# Patient Record
Sex: MALE | Race: WHITE | URBAN - METROPOLITAN AREA
[De-identification: names, ages, dates, MRNs, and addresses within clinical notes are randomized per-mention and may not be internally consistent; named-entity substitution may affect disease eponyms.]

---

## 2023-04-21 DIAGNOSIS — F41.9 ANXIETY: ICD-10-CM

## 2023-04-21 PROBLEM — K40.90 INGUINAL HERNIA: Status: ACTIVE | Noted: 2023-04-21

## 2023-04-21 PROBLEM — U07.1 COVID-19 VIRUS INFECTION: Status: ACTIVE | Noted: 2023-04-21

## 2023-04-21 PROBLEM — E11.9 DIET-CONTROLLED DIABETES MELLITUS (MULTI): Status: ACTIVE | Noted: 2023-04-21

## 2023-04-21 PROBLEM — I10 BENIGN ESSENTIAL HTN: Status: ACTIVE | Noted: 2023-04-21

## 2023-04-21 PROBLEM — J30.9 ALLERGIC RHINITIS: Status: ACTIVE | Noted: 2023-04-21

## 2023-04-21 PROBLEM — I10 BP (HIGH BLOOD PRESSURE): Status: ACTIVE | Noted: 2023-04-21

## 2023-04-21 PROBLEM — Z97.4 HEARING AID WORN: Status: ACTIVE | Noted: 2023-04-21

## 2023-04-21 PROBLEM — R11.0 NAUSEA IN ADULT: Status: ACTIVE | Noted: 2023-04-21

## 2023-04-21 PROBLEM — H91.93 HEARING LOSS, BILATERAL: Status: ACTIVE | Noted: 2023-04-21

## 2023-04-21 PROBLEM — J45.909 ASTHMA (HHS-HCC): Status: ACTIVE | Noted: 2023-04-21

## 2023-04-21 PROBLEM — M25.511 RIGHT SHOULDER PAIN: Status: ACTIVE | Noted: 2023-04-21

## 2023-04-21 RX ORDER — METHOCARBAMOL 750 MG/1
TABLET, FILM COATED ORAL
COMMUNITY
Start: 2022-09-06 | End: 2023-04-27 | Stop reason: ALTCHOICE

## 2023-04-21 RX ORDER — MOMETASONE FUROATE AND FORMOTEROL FUMARATE DIHYDRATE 200; 5 UG/1; UG/1
2 AEROSOL RESPIRATORY (INHALATION) 2 TIMES DAILY
COMMUNITY
End: 2023-08-08 | Stop reason: ALTCHOICE

## 2023-04-21 RX ORDER — METHYLPREDNISOLONE 4 MG/1
TABLET ORAL
COMMUNITY
Start: 2022-09-06 | End: 2023-04-27 | Stop reason: ALTCHOICE

## 2023-04-21 RX ORDER — CYCLOBENZAPRINE HCL 10 MG
TABLET ORAL
COMMUNITY

## 2023-04-21 RX ORDER — ASPIRIN 81 MG/1
1 TABLET ORAL DAILY
COMMUNITY
Start: 2022-09-01

## 2023-04-21 RX ORDER — ALPRAZOLAM 0.5 MG/1
0.5 TABLET ORAL NIGHTLY PRN
COMMUNITY
End: 2023-04-21 | Stop reason: SDUPTHER

## 2023-04-21 RX ORDER — BLOOD-GLUCOSE METER
KIT MISCELLANEOUS
COMMUNITY
Start: 2021-12-22 | End: 2024-03-27 | Stop reason: SDUPTHER

## 2023-04-21 RX ORDER — TRAMADOL HYDROCHLORIDE 50 MG/1
1 TABLET ORAL
COMMUNITY
Start: 2023-02-15 | End: 2024-03-05 | Stop reason: ALTCHOICE

## 2023-04-21 RX ORDER — ALBUTEROL SULFATE 90 UG/1
AEROSOL, METERED RESPIRATORY (INHALATION)
COMMUNITY

## 2023-04-21 RX ORDER — LANSOPRAZOLE 30 MG/1
CAPSULE, DELAYED RELEASE ORAL
COMMUNITY
End: 2023-08-08 | Stop reason: SDUPTHER

## 2023-04-21 RX ORDER — LISINOPRIL 20 MG/1
TABLET ORAL
COMMUNITY
End: 2023-08-08 | Stop reason: DRUGHIGH

## 2023-04-21 RX ORDER — BUSPIRONE HYDROCHLORIDE 5 MG/1
TABLET ORAL
COMMUNITY
End: 2023-08-08 | Stop reason: SDUPTHER

## 2023-04-24 RX ORDER — ALPRAZOLAM 0.5 MG/1
0.5 TABLET ORAL NIGHTLY PRN
Qty: 30 TABLET | Refills: 0 | Status: SHIPPED | OUTPATIENT
Start: 2023-04-24 | End: 2023-04-27 | Stop reason: SDUPTHER

## 2023-04-24 NOTE — TELEPHONE ENCOUNTER
I signed his rx, however this is benzo, controlled med, and he missed 3 months Follow up. And I do not see any future appointment. Please schedule appointment

## 2023-04-27 ENCOUNTER — OFFICE VISIT (OUTPATIENT)
Dept: PRIMARY CARE | Facility: CLINIC | Age: 68
End: 2023-04-27
Payer: MEDICARE

## 2023-04-27 VITALS
TEMPERATURE: 97.9 F | OXYGEN SATURATION: 95 % | SYSTOLIC BLOOD PRESSURE: 124 MMHG | HEART RATE: 99 BPM | DIASTOLIC BLOOD PRESSURE: 77 MMHG | WEIGHT: 176 LBS | HEIGHT: 68 IN | BODY MASS INDEX: 26.67 KG/M2

## 2023-04-27 DIAGNOSIS — F41.9 ANXIETY: ICD-10-CM

## 2023-04-27 DIAGNOSIS — G89.29 CHRONIC RIGHT SHOULDER PAIN: Primary | ICD-10-CM

## 2023-04-27 DIAGNOSIS — I10 PRIMARY HYPERTENSION: Chronic | ICD-10-CM

## 2023-04-27 DIAGNOSIS — M25.511 CHRONIC RIGHT SHOULDER PAIN: Primary | ICD-10-CM

## 2023-04-27 PROBLEM — M25.569 KNEE PAIN: Status: ACTIVE | Noted: 2023-04-27

## 2023-04-27 PROBLEM — Z91.199 NO-SHOW FOR APPOINTMENT: Status: ACTIVE | Noted: 2020-02-03

## 2023-04-27 PROBLEM — E11.9 TYPE 2 DIABETES MELLITUS (MULTI): Status: ACTIVE | Noted: 2023-04-27

## 2023-04-27 PROBLEM — M17.12 OSTEOARTHRITIS OF LEFT KNEE: Status: ACTIVE | Noted: 2023-04-27

## 2023-04-27 PROBLEM — S43.421A SPRAIN OF RIGHT ROTATOR CUFF CAPSULE: Status: ACTIVE | Noted: 2022-10-06

## 2023-04-27 PROBLEM — S46.011A STRAIN OF TENDON OF RIGHT ROTATOR CUFF: Status: ACTIVE | Noted: 2022-10-06

## 2023-04-27 PROBLEM — M19.90 ARTHRITIS: Status: ACTIVE | Noted: 2023-04-27

## 2023-04-27 PROCEDURE — 3078F DIAST BP <80 MM HG: CPT | Performed by: INTERNAL MEDICINE

## 2023-04-27 PROCEDURE — 3074F SYST BP LT 130 MM HG: CPT | Performed by: INTERNAL MEDICINE

## 2023-04-27 PROCEDURE — 4010F ACE/ARB THERAPY RXD/TAKEN: CPT | Performed by: INTERNAL MEDICINE

## 2023-04-27 PROCEDURE — 1036F TOBACCO NON-USER: CPT | Performed by: INTERNAL MEDICINE

## 2023-04-27 PROCEDURE — 99214 OFFICE O/P EST MOD 30 MIN: CPT | Performed by: INTERNAL MEDICINE

## 2023-04-27 PROCEDURE — 1159F MED LIST DOCD IN RCRD: CPT | Performed by: INTERNAL MEDICINE

## 2023-04-27 RX ORDER — IBUPROFEN 200 MG
400 TABLET ORAL
COMMUNITY
Start: 2019-07-16

## 2023-04-27 RX ORDER — FLUTICASONE PROPIONATE 50 MCG
SPRAY, SUSPENSION (ML) NASAL
COMMUNITY
Start: 2016-05-24 | End: 2023-08-08 | Stop reason: ALTCHOICE

## 2023-04-27 RX ORDER — ALPRAZOLAM 0.5 MG/1
0.5 TABLET ORAL NIGHTLY PRN
Qty: 90 TABLET | Refills: 0 | Status: SHIPPED | OUTPATIENT
Start: 2023-04-27 | End: 2023-08-08 | Stop reason: SDUPTHER

## 2023-04-27 NOTE — PROGRESS NOTES
The patient is here for:   Chief Complaint   Patient presents with    3 MONTH FUV        I have reviewed existing histories, notes, past medical history, surgical history, social history, family history, med list, allergy list, and immunization, and updated if applicable.  PCP: Neema Antony MD    HPI:     Follow up for anxiety and chronic pain. Will get right shoulder fixed in next few months.  Xanax taking for anxiety, taking half tab bid.  Pt and wife both have retired.  No Side effects noted to that and tramadol  OARRS:  No data recorded  I have personally reviewed the OARRS report for Rodrigo Dill. I have considered the risks of abuse, dependence, addiction and diversion    Is the patient prescribed a combination of a benzodiazepine and opioid?  Yes, I feel it is clincially indicated to continue the medication and have discussed with the patient risks/benefits/alternatives.    Last Urine Drug Screen / ordered today: Yes  Recent Results (from the past 99779 hour(s))   OPIATE/OPIOID/BENZO PRESCRIPTION COMPLIANCE    Collection Time: 01/16/23 11:37 AM   Result Value Ref Range    DRUG SCREEN COMMENT URINE SEE BELOW     Creatine, Urine 46.5 mg/dL    Amphetamine Screen, Urine PRESUMPTIVE NEGATIVE NEGATIVE    Barbiturate Screen, Urine PRESUMPTIVE NEGATIVE NEGATIVE    Cannabinoid Screen, Urine PRESUMPTIVE NEGATIVE NEGATIVE    Cocaine Screen, Urine PRESUMPTIVE NEGATIVE NEGATIVE    PCP Screen, Urine PRESUMPTIVE NEGATIVE NEGATIVE    7-Aminoclonazepam <25 Cutoff <25 ng/mL    Alpha-Hydroxyalprazolam <25 Cutoff <25 ng/mL    Alpha-Hydroxymidazolam <25 Cutoff <25 ng/mL    Alprazolam <25 Cutoff <25 ng/mL    Chlordiazepoxide <25 Cutoff <25 ng/mL    Clonazepam <25 Cutoff <25 ng/mL    Diazepam <25 Cutoff <25 ng/mL    Lorazepam <25 Cutoff <25 ng/mL    Midazolam <25 Cutoff <25 ng/mL    Nordiazepam <25 Cutoff <25 ng/mL    Oxazepam <25 Cutoff <25 ng/mL    Temazepam <25 Cutoff <25 ng/mL    Zolpidem <25 Cutoff <25 ng/mL    Zolpidem  Metabolite (ZCA) <25 Cutoff <25 ng/mL    6-Acetylmorphine <25 Cutoff <25 ng/mL    Codeine <50 Cutoff <50 ng/mL    Hydrocodone <25 Cutoff <25 ng/mL    Hydromorphone <25 Cutoff <25 ng/mL    Morphine Urine <50 Cutoff <50 ng/mL    Norhydrocodone <25 Cutoff <25 ng/mL    Noroxycodone <25 Cutoff <25 ng/mL    Oxycodone <25 Cutoff <25 ng/mL    Oxymorphone <25 Cutoff <25 ng/mL    Tramadol >1000 (A) Cutoff <50 ng/mL    O-Desmethyltramadol >1000 (A) Cutoff <50 ng/mL    Fentanyl <2.5 Cutoff<2.5 ng/mL    Norfentanyl <2.5 Cutoff<2.5 ng/mL    METHADONE CONFIRMATION,URINE <25 Cutoff <25 ng/mL    EDDP <25 Cutoff <25 ng/mL     Results are as expected.     Controlled Substance Agreement:  Date of the Last Agreement: 1/1/2023  Reviewed Controlled Substance Agreement including but not limited to the benefits, risks, and alternatives to treatment with a Controlled Substance medication(s).    Opioids:  What is the patient's goal of therapy? Pain control, to have right shoulder surgery soon  Is this being achieved with current treatment?  yes    I have calculated the patient's Morphine Dose Equivalent (MED):   I have considered referral to Pain Management and/or a specialist, and do not feel it is necessary at this time.    I feel that it is clinically indicated to continue this current medication regimen after consideration of alternative therapies, and other non-opioid treatment.    Opioid Risk Screening:  No data recorded    Pain Assessment:  No data recorded and Benzodiazepines:  What is the patient's goal of therapy? anxiety and insomnia  Is this being achieved with current treatment?  yes    VIVI-7:  No data recorded    Activities of Daily Living:   Is your overall impression that this patient is benefiting (symptom reduction outweighs side effects) from benzodiazepine therapy? Yes     1. Physical Functioning: Better  2. Family Relationship: Better  3. Social Relationship: Better  4. Mood: Better  5. Sleep Patterns: Better  6. Overall  "Function: Better  Lab Results   Component Value Date    WBC 6.1 10/19/2022    HGB 13.9 10/19/2022    HCT 41.9 10/19/2022     10/19/2022    CHOL 171 12/22/2021    TRIG 89 01/23/2021    HDL 38.4 (A) 12/22/2021    LDLDIRECT 107 12/22/2021    ALT 23 12/22/2021    AST 19 12/22/2021     (L) 10/25/2022    K 4.4 10/25/2022     10/25/2022    CREATININE 1.17 10/25/2022    BUN 20 10/25/2022    CO2 26 10/25/2022    PSA 0.43 01/23/2021    HGBA1C 7.5 (A) 10/19/2022     Physical exam:  /77   Pulse 99   Temp 36.6 °C (97.9 °F)   Ht 1.727 m (5' 8\")   Wt 79.8 kg (176 lb)   SpO2 95%   BMI 26.76 kg/m²    NAD, mood affection appropriate.  Came in with wife today  No carotid bruit  Neck exam showed no mass, lymphadenopathy, or thyroid enlargement, and no carotid bruit.  Heart exam showed normal heart sounds, no murmur, clicks, gallops or rubs. Regular rate and rhythm. Chest is clear; no wheezes or rales.  No leg edema    Assessments/orders:   1. Anxiety     - Alprazolam (Xanax) 0.5 mg tablet; Take 1 tablet (0.5 mg) by mouth as needed at bedtime for anxiety.  Dispense: 90 tablet; Refill: 0    2. Chronic right shoulder pain       3. Primary hypertension         Plan/discussion and follow up:   Follow up 90 days  Refilled xanax for 90 days today  It was sent to wrong pharmacy earlier this week           "

## 2023-05-02 ENCOUNTER — TELEPHONE (OUTPATIENT)
Dept: PRIMARY CARE | Facility: CLINIC | Age: 68
End: 2023-05-02
Payer: COMMERCIAL

## 2023-05-09 LAB
ANION GAP IN SER/PLAS: 12 MMOL/L (ref 10–20)
BASOPHILS (10*3/UL) IN BLOOD BY AUTOMATED COUNT: 0.04 X10E9/L (ref 0–0.1)
BASOPHILS/100 LEUKOCYTES IN BLOOD BY AUTOMATED COUNT: 0.5 % (ref 0–2)
CALCIUM (MG/DL) IN SER/PLAS: 9.9 MG/DL (ref 8.6–10.3)
CARBON DIOXIDE, TOTAL (MMOL/L) IN SER/PLAS: 27 MMOL/L (ref 21–32)
CHLORIDE (MMOL/L) IN SER/PLAS: 100 MMOL/L (ref 98–107)
CREATININE (MG/DL) IN SER/PLAS: 1.01 MG/DL (ref 0.5–1.3)
EOSINOPHILS (10*3/UL) IN BLOOD BY AUTOMATED COUNT: 0.23 X10E9/L (ref 0–0.7)
EOSINOPHILS/100 LEUKOCYTES IN BLOOD BY AUTOMATED COUNT: 3.1 % (ref 0–6)
ERYTHROCYTE DISTRIBUTION WIDTH (RATIO) BY AUTOMATED COUNT: 13.1 % (ref 11.5–14.5)
ERYTHROCYTE MEAN CORPUSCULAR HEMOGLOBIN CONCENTRATION (G/DL) BY AUTOMATED: 32.5 G/DL (ref 32–36)
ERYTHROCYTE MEAN CORPUSCULAR VOLUME (FL) BY AUTOMATED COUNT: 93 FL (ref 80–100)
ERYTHROCYTES (10*6/UL) IN BLOOD BY AUTOMATED COUNT: 4.96 X10E12/L (ref 4.5–5.9)
GFR MALE: 82 ML/MIN/1.73M2
GLUCOSE (MG/DL) IN SER/PLAS: 129 MG/DL (ref 74–99)
HEMATOCRIT (%) IN BLOOD BY AUTOMATED COUNT: 45.9 % (ref 41–52)
HEMOGLOBIN (G/DL) IN BLOOD: 14.9 G/DL (ref 13.5–17.5)
IMMATURE GRANULOCYTES/100 LEUKOCYTES IN BLOOD BY AUTOMATED COUNT: 0.3 % (ref 0–0.9)
LEUKOCYTES (10*3/UL) IN BLOOD BY AUTOMATED COUNT: 7.3 X10E9/L (ref 4.4–11.3)
LYMPHOCYTES (10*3/UL) IN BLOOD BY AUTOMATED COUNT: 2.89 X10E9/L (ref 1.2–4.8)
LYMPHOCYTES/100 LEUKOCYTES IN BLOOD BY AUTOMATED COUNT: 39.4 % (ref 13–44)
MONOCYTES (10*3/UL) IN BLOOD BY AUTOMATED COUNT: 0.72 X10E9/L (ref 0.1–1)
MONOCYTES/100 LEUKOCYTES IN BLOOD BY AUTOMATED COUNT: 9.8 % (ref 2–10)
NEUTROPHILS (10*3/UL) IN BLOOD BY AUTOMATED COUNT: 3.44 X10E9/L (ref 1.2–7.7)
NEUTROPHILS/100 LEUKOCYTES IN BLOOD BY AUTOMATED COUNT: 46.9 % (ref 40–80)
PLATELETS (10*3/UL) IN BLOOD AUTOMATED COUNT: 275 X10E9/L (ref 150–450)
POTASSIUM (MMOL/L) IN SER/PLAS: 4.4 MMOL/L (ref 3.5–5.3)
SODIUM (MMOL/L) IN SER/PLAS: 135 MMOL/L (ref 136–145)
UREA NITROGEN (MG/DL) IN SER/PLAS: 20 MG/DL (ref 6–23)

## 2023-05-11 LAB — STAPH/MRSA SCREEN, CULTURE: NORMAL

## 2023-05-24 ENCOUNTER — HOSPITAL ENCOUNTER (OUTPATIENT)
Dept: DATA CONVERSION | Facility: HOSPITAL | Age: 68
End: 2023-05-24
Attending: ORTHOPAEDIC SURGERY | Admitting: ORTHOPAEDIC SURGERY
Payer: COMMERCIAL

## 2023-05-24 DIAGNOSIS — Z87.891 PERSONAL HISTORY OF NICOTINE DEPENDENCE: ICD-10-CM

## 2023-05-24 DIAGNOSIS — I10 ESSENTIAL (PRIMARY) HYPERTENSION: ICD-10-CM

## 2023-05-24 DIAGNOSIS — M25.511 PAIN IN RIGHT SHOULDER: ICD-10-CM

## 2023-05-24 DIAGNOSIS — S46.011A STRAIN OF MUSCLE(S) AND TENDON(S) OF THE ROTATOR CUFF OF RIGHT SHOULDER, INITIAL ENCOUNTER: ICD-10-CM

## 2023-05-24 DIAGNOSIS — M75.21 BICIPITAL TENDINITIS, RIGHT SHOULDER: ICD-10-CM

## 2023-05-24 DIAGNOSIS — J45.909 UNSPECIFIED ASTHMA, UNCOMPLICATED (HHS-HCC): ICD-10-CM

## 2023-05-24 DIAGNOSIS — Z79.82 LONG TERM (CURRENT) USE OF ASPIRIN: ICD-10-CM

## 2023-05-25 LAB
BASOPHILS (10*3/UL) IN BLOOD BY AUTOMATED COUNT: NORMAL
BASOPHILS/100 LEUKOCYTES IN BLOOD BY AUTOMATED COUNT: NORMAL
EOSINOPHILS (10*3/UL) IN BLOOD BY AUTOMATED COUNT: NORMAL
EOSINOPHILS/100 LEUKOCYTES IN BLOOD BY AUTOMATED COUNT: NORMAL
ERYTHROCYTE DISTRIBUTION WIDTH (RATIO) BY AUTOMATED COUNT: NORMAL
ERYTHROCYTE MEAN CORPUSCULAR HEMOGLOBIN CONCENTRATION (G/DL) BY AUTOMATED: NORMAL
ERYTHROCYTE MEAN CORPUSCULAR VOLUME (FL) BY AUTOMATED COUNT: NORMAL
ERYTHROCYTES (10*6/UL) IN BLOOD BY AUTOMATED COUNT: NORMAL
HEMATOCRIT (%) IN BLOOD BY AUTOMATED COUNT: NORMAL
HEMOGLOBIN (G/DL) IN BLOOD: NORMAL
IMMATURE GRANULOCYTES/100 LEUKOCYTES IN BLOOD BY AUTOMATED COUNT: NORMAL
LEUKOCYTES (10*3/UL) IN BLOOD BY AUTOMATED COUNT: NORMAL
LYMPHOCYTES (10*3/UL) IN BLOOD BY AUTOMATED COUNT: NORMAL
LYMPHOCYTES/100 LEUKOCYTES IN BLOOD BY AUTOMATED COUNT: NORMAL
MANUAL DIFFERENTIAL Y/N: NORMAL
MONOCYTES (10*3/UL) IN BLOOD BY AUTOMATED COUNT: NORMAL
MONOCYTES/100 LEUKOCYTES IN BLOOD BY AUTOMATED COUNT: NORMAL
NEUTROPHILS (10*3/UL) IN BLOOD BY AUTOMATED COUNT: NORMAL
NEUTROPHILS/100 LEUKOCYTES IN BLOOD BY AUTOMATED COUNT: NORMAL
NRBC (PER 100 WBCS) BY AUTOMATED COUNT: NORMAL
PLATELETS (10*3/UL) IN BLOOD AUTOMATED COUNT: NORMAL

## 2023-07-27 ENCOUNTER — APPOINTMENT (OUTPATIENT)
Dept: PRIMARY CARE | Facility: CLINIC | Age: 68
End: 2023-07-27
Payer: MEDICARE

## 2023-08-07 PROBLEM — S46.919A: Status: ACTIVE | Noted: 2023-08-07

## 2023-08-07 PROBLEM — Z96.611 STATUS POST TOTAL REPLACEMENT OF RIGHT SHOULDER: Status: ACTIVE | Noted: 2023-08-07

## 2023-08-07 PROBLEM — M75.101 ROTATOR CUFF SYNDROME OF RIGHT SHOULDER: Status: ACTIVE | Noted: 2023-08-07

## 2023-08-08 ENCOUNTER — OFFICE VISIT (OUTPATIENT)
Dept: PRIMARY CARE | Facility: CLINIC | Age: 68
End: 2023-08-08
Payer: MEDICARE

## 2023-08-08 VITALS
DIASTOLIC BLOOD PRESSURE: 66 MMHG | OXYGEN SATURATION: 95 % | HEIGHT: 68 IN | SYSTOLIC BLOOD PRESSURE: 110 MMHG | BODY MASS INDEX: 26.37 KG/M2 | WEIGHT: 174 LBS | HEART RATE: 89 BPM | TEMPERATURE: 97.7 F

## 2023-08-08 DIAGNOSIS — F41.9 ANXIETY: ICD-10-CM

## 2023-08-08 DIAGNOSIS — I10 BENIGN ESSENTIAL HTN: ICD-10-CM

## 2023-08-08 DIAGNOSIS — E78.5 HYPERLIPIDEMIA, UNSPECIFIED HYPERLIPIDEMIA TYPE: ICD-10-CM

## 2023-08-08 DIAGNOSIS — K21.9 GASTROESOPHAGEAL REFLUX DISEASE WITHOUT ESOPHAGITIS: ICD-10-CM

## 2023-08-08 DIAGNOSIS — Z12.5 SCREENING FOR PROSTATE CANCER: ICD-10-CM

## 2023-08-08 DIAGNOSIS — E11.9 DIET-CONTROLLED DIABETES MELLITUS (MULTI): Primary | ICD-10-CM

## 2023-08-08 PROCEDURE — 1159F MED LIST DOCD IN RCRD: CPT | Performed by: INTERNAL MEDICINE

## 2023-08-08 PROCEDURE — 1125F AMNT PAIN NOTED PAIN PRSNT: CPT | Performed by: INTERNAL MEDICINE

## 2023-08-08 PROCEDURE — 3078F DIAST BP <80 MM HG: CPT | Performed by: INTERNAL MEDICINE

## 2023-08-08 PROCEDURE — 4010F ACE/ARB THERAPY RXD/TAKEN: CPT | Performed by: INTERNAL MEDICINE

## 2023-08-08 PROCEDURE — 3074F SYST BP LT 130 MM HG: CPT | Performed by: INTERNAL MEDICINE

## 2023-08-08 PROCEDURE — 99214 OFFICE O/P EST MOD 30 MIN: CPT | Performed by: INTERNAL MEDICINE

## 2023-08-08 PROCEDURE — 1036F TOBACCO NON-USER: CPT | Performed by: INTERNAL MEDICINE

## 2023-08-08 RX ORDER — LANSOPRAZOLE 30 MG/1
30 CAPSULE, DELAYED RELEASE ORAL
Qty: 30 CAPSULE | Refills: 5 | Status: SHIPPED | OUTPATIENT
Start: 2023-08-08 | End: 2024-03-05 | Stop reason: SDUPTHER

## 2023-08-08 RX ORDER — BUSPIRONE HYDROCHLORIDE 5 MG/1
5 TABLET ORAL 2 TIMES DAILY
Qty: 60 TABLET | Refills: 5 | Status: SHIPPED | OUTPATIENT
Start: 2023-08-08 | End: 2024-03-05 | Stop reason: SDUPTHER

## 2023-08-08 RX ORDER — LISINOPRIL 5 MG/1
5 TABLET ORAL DAILY
Qty: 30 TABLET | Refills: 5 | Status: SHIPPED | OUTPATIENT
Start: 2023-08-08 | End: 2023-08-09

## 2023-08-08 RX ORDER — ALPRAZOLAM 0.5 MG/1
0.5 TABLET ORAL NIGHTLY PRN
Qty: 30 TABLET | Refills: 2 | Status: SHIPPED | OUTPATIENT
Start: 2023-08-08 | End: 2023-09-06 | Stop reason: SDUPTHER

## 2023-08-08 NOTE — PROGRESS NOTES
SUBJECTIVE:   Rodrigo Dill is a 67 y.o. male presenting for Follow up   PCP: Neema Antony MD   Had right shoulder surgical repair 3 months ago.  Doing PT, much better  Only take tramadol before going to PT,  Needs refills for xanax.   OARRS:  Neema Antony MD on 8/8/2023 12:36 PM  I have personally reviewed the OARRS report for Rodrigo Dill. I have considered the risks of abuse, dependence, addiction and diversion    Is the patient prescribed a combination of a benzodiazepine and opioid?  Yes, I feel it is clincially indicated to continue the medication and have discussed with the patient risks/benefits/alternatives.    Last Urine Drug Screen / ordered today: Yes  Recent Results (from the past 35885 hour(s))   OPIATE/OPIOID/BENZO PRESCRIPTION COMPLIANCE    Collection Time: 01/16/23 11:37 AM   Result Value Ref Range    DRUG SCREEN COMMENT URINE SEE BELOW     Creatine, Urine 46.5 mg/dL    Amphetamine Screen, Urine PRESUMPTIVE NEGATIVE NEGATIVE    Barbiturate Screen, Urine PRESUMPTIVE NEGATIVE NEGATIVE    Cannabinoid Screen, Urine PRESUMPTIVE NEGATIVE NEGATIVE    Cocaine Screen, Urine PRESUMPTIVE NEGATIVE NEGATIVE    PCP Screen, Urine PRESUMPTIVE NEGATIVE NEGATIVE    7-Aminoclonazepam <25 Cutoff <25 ng/mL    Alpha-Hydroxyalprazolam <25 Cutoff <25 ng/mL    Alpha-Hydroxymidazolam <25 Cutoff <25 ng/mL    Alprazolam <25 Cutoff <25 ng/mL    Chlordiazepoxide <25 Cutoff <25 ng/mL    Clonazepam <25 Cutoff <25 ng/mL    Diazepam <25 Cutoff <25 ng/mL    Lorazepam <25 Cutoff <25 ng/mL    Midazolam <25 Cutoff <25 ng/mL    Nordiazepam <25 Cutoff <25 ng/mL    Oxazepam <25 Cutoff <25 ng/mL    Temazepam <25 Cutoff <25 ng/mL    Zolpidem <25 Cutoff <25 ng/mL    Zolpidem Metabolite (ZCA) <25 Cutoff <25 ng/mL    6-Acetylmorphine <25 Cutoff <25 ng/mL    Codeine <50 Cutoff <50 ng/mL    Hydrocodone <25 Cutoff <25 ng/mL    Hydromorphone <25 Cutoff <25 ng/mL    Morphine Urine <50 Cutoff <50 ng/mL    Norhydrocodone <25 Cutoff <25 ng/mL     "Noroxycodone <25 Cutoff <25 ng/mL    Oxycodone <25 Cutoff <25 ng/mL    Oxymorphone <25 Cutoff <25 ng/mL    Tramadol >1000 (A) Cutoff <50 ng/mL    O-Desmethyltramadol >1000 (A) Cutoff <50 ng/mL    Fentanyl <2.5 Cutoff<2.5 ng/mL    Norfentanyl <2.5 Cutoff<2.5 ng/mL    METHADONE CONFIRMATION,URINE <25 Cutoff <25 ng/mL    EDDP <25 Cutoff <25 ng/mL     Results are as expected.     Controlled Substance Agreement:  Date of the Last Agreement: January 2023  Reviewed Controlled Substance Agreement including but not limited to the benefits, risks, and alternatives to treatment with a Controlled Substance medication(s).    Benzodiazepines:  What is the patient's goal of therapy? Anxiety control  Is this being achieved with current treatment? yes    VIVI-7:  No data recorded    Activities of Daily Living:   Is your overall impression that this patient is benefiting (symptom reduction outweighs side effects) from benzodiazepine therapy? Yes     1. Physical Functioning: Better  2. Family Relationship: Better  3. Social Relationship: Better  4. Mood: Better  5. Sleep Patterns: Better  6. Overall Function: Better  Colon screening:   his insurance company just send him for FIT test, pending result.    Lab Results   Component Value Date    PSA 0.43 01/23/2021     Vaccines Up to date:    Diet:   healthy.  Exercises:   regularly.  Lab Results   Component Value Date    HGBA1C 7.5 (A) 10/19/2022     Lab Results   Component Value Date    CREATININE 1.01 05/09/2023     Lab Results   Component Value Date    CHOL 171 12/22/2021    CHOL 168 01/23/2021     Lab Results   Component Value Date    HDL 38.4 (A) 12/22/2021    HDL 44.3 01/23/2021     No results found for: \"LDLCALC\"  Lab Results   Component Value Date    TRIG 89 01/23/2021     No components found for: \"CHOLHDL\"    ROS:  Feeling well. No dyspnea or chest pain on exertion. No abdominal pain, change in bowel habits, black or bloody stools. No urinary tract or prostatic symptoms. No " "neurological complaints.       OBJECTIVE:   The patient appears well, alert, oriented x 3, in no distress.   /66   Pulse 89   Temp 36.5 °C (97.7 °F)   Ht 1.715 m (5' 7.5\")   Wt 78.9 kg (174 lb)   SpO2 95%   BMI 26.85 kg/m²   ENT normal.  Neck supple. No adenopathy or thyromegaly. DORIS. Lungs are clear, good air entry, no wheezes, rhonchi or rales. S1 and S2 normal, no murmurs, regular rate and rhythm. Abdomen is soft without tenderness, guarding, mass or organomegaly. Extremities show no edema, normal peripheral pulses. Neurological is normal without focal findings.      1. Diet-controlled diabetes mellitus (CMS/HCC)  Hemoglobin A1C, Comprehensive Metabolic Panel      2. Benign essential HTN  lisinopril 5 mg tablet, Comprehensive Metabolic Panel      3. Anxiety  busPIRone (Buspar) 5 mg tablet, ALPRAZolam (Xanax) 0.5 mg tablet      4. Gastroesophageal reflux disease without esophagitis  lansoprazole (Prevacid) 30 mg DR capsule      5. Hyperlipidemia, unspecified hyperlipidemia type  Lipid Panel      6. Screening for prostate cancer  Prostate Specific Antigen, Screen      Follow up 3 months       "

## 2023-08-09 ENCOUNTER — LAB (OUTPATIENT)
Dept: LAB | Facility: LAB | Age: 68
End: 2023-08-09
Payer: MEDICARE

## 2023-08-09 DIAGNOSIS — Z12.5 SCREENING FOR PROSTATE CANCER: ICD-10-CM

## 2023-08-09 DIAGNOSIS — E78.5 HYPERLIPIDEMIA, UNSPECIFIED HYPERLIPIDEMIA TYPE: ICD-10-CM

## 2023-08-09 DIAGNOSIS — E11.9 DIET-CONTROLLED DIABETES MELLITUS (MULTI): ICD-10-CM

## 2023-08-09 DIAGNOSIS — I10 BENIGN ESSENTIAL HTN: ICD-10-CM

## 2023-08-09 LAB
ALANINE AMINOTRANSFERASE (SGPT) (U/L) IN SER/PLAS: 38 U/L (ref 10–52)
ALBUMIN (G/DL) IN SER/PLAS: 4.4 G/DL (ref 3.4–5)
ALKALINE PHOSPHATASE (U/L) IN SER/PLAS: 48 U/L (ref 33–136)
ANION GAP IN SER/PLAS: 11 MMOL/L (ref 10–20)
ASPARTATE AMINOTRANSFERASE (SGOT) (U/L) IN SER/PLAS: 35 U/L (ref 9–39)
BILIRUBIN TOTAL (MG/DL) IN SER/PLAS: 0.6 MG/DL (ref 0–1.2)
CALCIUM (MG/DL) IN SER/PLAS: 10 MG/DL (ref 8.6–10.3)
CARBON DIOXIDE, TOTAL (MMOL/L) IN SER/PLAS: 29 MMOL/L (ref 21–32)
CHLORIDE (MMOL/L) IN SER/PLAS: 102 MMOL/L (ref 98–107)
CHOLESTEROL (MG/DL) IN SER/PLAS: 192 MG/DL (ref 0–199)
CHOLESTEROL IN HDL (MG/DL) IN SER/PLAS: 45 MG/DL
CHOLESTEROL/HDL RATIO: 4.3
CREATININE (MG/DL) IN SER/PLAS: 1.08 MG/DL (ref 0.5–1.3)
ESTIMATED AVERAGE GLUCOSE FOR HBA1C: 194 MG/DL
GFR MALE: 75 ML/MIN/1.73M2
GLUCOSE (MG/DL) IN SER/PLAS: 153 MG/DL (ref 74–99)
HEMOGLOBIN A1C/HEMOGLOBIN TOTAL IN BLOOD: 8.4 %
LDL: 117 MG/DL (ref 0–99)
POTASSIUM (MMOL/L) IN SER/PLAS: 5.3 MMOL/L (ref 3.5–5.3)
PROSTATE SPECIFIC ANTIGEN,SCREEN: 0.62 NG/ML (ref 0–4)
PROTEIN TOTAL: 7.4 G/DL (ref 6.4–8.2)
SODIUM (MMOL/L) IN SER/PLAS: 137 MMOL/L (ref 136–145)
TRIGLYCERIDE (MG/DL) IN SER/PLAS: 152 MG/DL (ref 0–149)
UREA NITROGEN (MG/DL) IN SER/PLAS: 24 MG/DL (ref 6–23)
VLDL: 30 MG/DL (ref 0–40)

## 2023-08-09 PROCEDURE — 36415 COLL VENOUS BLD VENIPUNCTURE: CPT

## 2023-08-09 PROCEDURE — 80061 LIPID PANEL: CPT

## 2023-08-09 PROCEDURE — 83036 HEMOGLOBIN GLYCOSYLATED A1C: CPT

## 2023-08-09 PROCEDURE — 80053 COMPREHEN METABOLIC PANEL: CPT

## 2023-08-09 PROCEDURE — G0103 PSA SCREENING: HCPCS

## 2023-08-09 RX ORDER — LISINOPRIL 5 MG/1
5 TABLET ORAL DAILY
Qty: 90 TABLET | Refills: 1 | Status: SHIPPED | OUTPATIENT
Start: 2023-08-09 | End: 2024-02-16 | Stop reason: SDUPTHER

## 2023-09-06 DIAGNOSIS — F41.9 ANXIETY: ICD-10-CM

## 2023-09-06 RX ORDER — ALPRAZOLAM 0.5 MG/1
0.5 TABLET ORAL NIGHTLY PRN
Qty: 30 TABLET | Refills: 0 | Status: CANCELLED | OUTPATIENT
Start: 2023-09-06

## 2023-09-06 RX ORDER — LISINOPRIL 20 MG/1
TABLET ORAL
COMMUNITY
Start: 2023-08-31 | End: 2024-02-16 | Stop reason: SDUPTHER

## 2023-09-06 RX ORDER — ALPRAZOLAM 0.5 MG/1
0.5 TABLET ORAL NIGHTLY PRN
Qty: 30 TABLET | Refills: 0 | Status: SHIPPED | OUTPATIENT
Start: 2023-09-06 | End: 2023-10-09 | Stop reason: SDUPTHER

## 2023-09-07 VITALS — BODY MASS INDEX: 25.89 KG/M2 | HEIGHT: 68 IN | WEIGHT: 170.86 LBS

## 2023-09-30 NOTE — H&P
History & Physical Reviewed:   I have reviewed the History and Physical dated:  09-May-2023   History and Physical reviewed and relevant findings noted. Patient examined to review pertinent physical  findings.: No significant changes   Home Medications Reviewed: no changes noted   Allergies Reviewed: no changes noted       ERAS (Enhanced Recovery After Surgery):  ·  ERAS Patient: no     Consent:   COVID-19 Consent:  ·  COVID-19 Risk Consent Surgeon has reviewed key risks related to the risk of jennifer COVID-19 and if they contract COVID-19 what the risks are.       Electronic Signatures:  Oswaldo Ackerman)  (Signed 24-May-2023 09:48)   Authored: History & Physical Reviewed, ERAS, Consent,  Note Completion      Last Updated: 24-May-2023 09:48 by Oswaldo Ackerman)

## 2023-10-02 ENCOUNTER — APPOINTMENT (OUTPATIENT)
Dept: PHYSICAL THERAPY | Facility: CLINIC | Age: 68
End: 2023-10-02
Payer: COMMERCIAL

## 2023-10-02 NOTE — OP NOTE
Post Operative Note:     PreOp Diagnosis: right shoulder rotator cuff tear,  biceps tendonopathy   Post-Procedure Diagnosis: same   Procedure: 1. reverse total shoulder replacement  2. biceps tenodesis  3.   4.   5.   Surgeon: minerva   Resident/Fellow/Other Assistant: Adonay/Joe   Estimated Blood Loss (mL): 50   Specimen: no   Findings: massive cuff tear       Electronic Signatures:  Oswaldo Ackerman)  (Signed 24-May-2023 12:45)   Authored: Post Operative Note, Note Completion      Last Updated: 24-May-2023 12:45 by Oswaldo Ackerman)

## 2023-10-04 ENCOUNTER — TREATMENT (OUTPATIENT)
Dept: PHYSICAL THERAPY | Facility: CLINIC | Age: 68
End: 2023-10-04
Payer: COMMERCIAL

## 2023-10-04 DIAGNOSIS — S46.911D: ICD-10-CM

## 2023-10-04 DIAGNOSIS — S46.011D TRAUMATIC COMPLETE TEAR OF RIGHT ROTATOR CUFF, SUBSEQUENT ENCOUNTER: Primary | ICD-10-CM

## 2023-10-04 PROBLEM — S46.011A TRAUMATIC COMPLETE TEAR OF RIGHT ROTATOR CUFF: Status: ACTIVE | Noted: 2023-10-04

## 2023-10-04 PROCEDURE — 97140 MANUAL THERAPY 1/> REGIONS: CPT | Mod: GP | Performed by: PHYSICAL THERAPIST

## 2023-10-04 PROCEDURE — 97110 THERAPEUTIC EXERCISES: CPT | Mod: GP | Performed by: PHYSICAL THERAPIST

## 2023-10-04 ASSESSMENT — PAIN SCALES - GENERAL: PAINLEVEL_OUTOF10: 0 - NO PAIN

## 2023-10-04 ASSESSMENT — PAIN - FUNCTIONAL ASSESSMENT: PAIN_FUNCTIONAL_ASSESSMENT: 0-10

## 2023-10-04 ASSESSMENT — ENCOUNTER SYMPTOMS
DEPRESSION: 0
OCCASIONAL FEELINGS OF UNSTEADINESS: 0
LOSS OF SENSATION IN FEET: 0

## 2023-10-04 NOTE — PROGRESS NOTES
Physical Therapy    Physical Therapy Treatment    Patient Name: Rodrigo Dill  MRN: [id number]  Today's Date: 10/4/2023  Time Calculation  Start Time: 1104  Stop Time: 1150  Time Calculation (min): 46 min  Visit number: 22   Approved number of visits: 28 (24 approved)   [facility]  100% UCR  5/31/23-10/11/23     Assessment:  PT Assessment  PT Assessment Results: Decreased strength, Decreased range of motion, Pain  Rehab Prognosis: Excellent  PT treatment focuses on therapeutic exercises targeting improving strength and stability of the right upper extremity. Patient is able to complete all exercises fully this date, but notes increased pain with active elevation of the shoulder against gravity. Increased muscular compensation of the upper trapezius is also noted during active elevation. No significant increase in pain is reported at end of session, but increased muscular fatigue as expected. HEP updated for additional progression while not in PT sessions. He has progressed well towards his established therapy goals, and PT will plan to perform re-evaluation at the end of next week to determine discharge vs. Extension of POC.    Plan:  OP PT Plan  PT Plan: Skilled PT  Rehab Potential: Excellent  Continue to progress strength and stability of the right upper extremity  Current Problem  1. Traumatic complete tear of right rotator cuff, subsequent encounter        2. Rupture of tendon of upper extremity, right, subsequent encounter            Subjective   Pt reports that the shoulder has been feeling good, and he has been relatively pain free since     Precautions     Pain  Pain Assessment: 0-10  Pain Score: 0 - No pain  Pain Location: Shoulder  Pain Orientation: Right    Objective   Pt demonstrates increased activation of the upper trapezius during standing shoulder lifts to compensate for weakness in the upper extremity    Treatments:  Therapeutic Exercise  Therapeutic Exercise Activity 1: UBE L3 2' fwd 2' bwd  "ALT  Therapeutic Exercise Activity 2: Supine shoulder flexion 1# 2x10  Therapeutic Exercise Activity 3: Ball on wall up/down, R/L, CW/CCW x20 ea  Therapeutic Exercise Activity 4: Standing shoulder flexion/scaption/abd 0# x15/10/10  Therapeutic Exercise Activity 5: Serratus punch 3# 2x10  Therapeutic Exercise Activity 6: SL shoulder ER 1# x15  Therapeutic Exercise Activity 7: SL shoulder abd 1# x15  Therapeutic Exercise Activity 8: SL shoulder flexion 1# x15  Therapeutic Exercise Activity 9: Standing Y with lift off 15x3\"  Therapeutic Exercise Activity 10: High row BTT 2x10  [id number]  (29 min)  Manual Therapy  Manual Therapy Activity 1: PROM R shoulder within tissue restrictions  [id number]  (11 min)    Goals:     1. Patient will demonstrate knowledge and compliance with HEP  2. Patient will report decreased pain intensity in R shoulder of 0/10 at rest and </= 2/10 with any activity within the past 1-2 weeks.   3. Patient will report sleeping through the night without interruption by pain at least 5/7 nights during the past week to promote increased daily function.   4. Patient will demonstrate anterior, lateral, and posterior deltoid strength of at least 4+/5 to promote increased function with reaching, bathing, and dressing  5. Patient will demonstrate R shoulder elevation against gravity of at least 120 degrees for improved ability to perform overhead tasks  6. Patient will be able to maintain proper seated posture for at least 10 minutes without external cues from therapist to decrease the likelihood of future shoulder impairment/ injury  7. Patient will be able to demonstrate the ability to reach behind his head in order to perform dressing and bathing activities. PROGRESSING  8. Patient will verbalize understanding of surgical precautions to promote safety and proper healing.  9. Patient will report the ability to return to desired recreational activities without significant restriction from the R shoulder such " as but not limited to hunting and riding his motorcycle  10. Updated 8/30/23: Patient will demonstrate the ability to lift a 4lb weight onto a shelf above shoulder height 5x consecutively without reports of increased pain to improve his ability to perform light lifting tasks at home

## 2023-10-04 NOTE — PROGRESS NOTES
Physical Therapy    Physical Therapy Treatment  Therapy Diagnosis  Assessed    1. Rupture of tendon of upper extremity, right, subsequent encounter (V58.89,840.9)   (S43.964P)   2. Traumatic complete tear of right rotator cuff, subsequent encounter (V58.89,840.4)   (S46.316D)    Patient Name: Rodrigo Dill  MRN: [id number]  Today's Date: 10/9/2023  Time Calculation  Start Time: 0928  Stop Time: 1028  Time Calculation (min): 60 min  Visit number: 23  Approved number of visits: 28 (24 approved)   [facility]  100% UCR  5/31/23-10/11/23     Assessment:  PT Assessment  Evaluation/Treatment Tolerance: Patient tolerated treatment well  PT treatment focuses on therapeutic exercises targeting improving strength and stability of the right upper extremity. Patient is able to complete all exercises fully this date, but notes increased soreness/ difficulty w/increased there ex. Increased muscular compensation of the upper trapezius is also noted during active elevation. No skin abnormality noted after modalities. HEP updated for additional progression while not in PT sessions. He has progressed well towards his established therapy goals, and PT will plan to perform re-evaluation at the end of next week to determine discharge vs. Extension of POC.    Plan:  OP PT Plan  PT Plan: Skilled PTContinue to progress strength and stability of the right upper extremity    Subjective   Pt reports that the shoulder has been feeling pretty good, but continues to note how weak his R shoulder is still.    Precautions  Precautions  STEADI Fall Risk Score (The score of 4 or more indicates an increased risk of falling): 0per protocol  Hx of hypertension, asthma  Hearing impaired.   Pain  Pain Assessment: 0-10  Pain Score: 3  Pain Location: Shoulder  Pain Orientation: Right1-3/10 R shoulder pain today.    Objective   R Shoulder AROM 159 degrees     Treatments:  9:28a-9:53a  UBE L3 f/r 2'ea alt   Ball on wall up/down CW/CCW x20ea NT  Shoulder flex/  "scap/ abd 0# x15/10/10  Serratus Punch 3# 2x10  Wall pu NV  SL shoulder Flex/ ER/ ABD 1# 15ea  Standing Y w/lift off 15x3\" NT  Mid Row PinkTT 2x10  Lat Stretch 5x30\"  Prone shoulder flex, scap, ABD (2way), ext 0# x10ea  B shouler ER w/YTB 2x10  Lower Trap PurpleTT 2x15    Manual 9:55a-10:10a  PROM R shoulder. R scapular MET medial/ lat/ superior. TPR to R lats/ pecs/ biceps.    Modalities: 10:12-10:22  Cp to R shoulder after tx (sit w/UE supported).    HEP Issued and reviewed written HEP of:    10/9/23: Prone shoulder flex, scap, ABD (2way), ext 0# x10ea, B shouler ER w/YTB  "

## 2023-10-06 RX ORDER — ONDANSETRON 4 MG/1
TABLET, FILM COATED ORAL
COMMUNITY

## 2023-10-06 RX ORDER — OXYCODONE AND ACETAMINOPHEN 5; 325 MG/1; MG/1
TABLET ORAL
COMMUNITY
End: 2024-03-05 | Stop reason: ALTCHOICE

## 2023-10-06 RX ORDER — MOMETASONE FUROATE AND FORMOTEROL FUMARATE DIHYDRATE 200; 5 UG/1; UG/1
AEROSOL RESPIRATORY (INHALATION)
COMMUNITY

## 2023-10-09 ENCOUNTER — TREATMENT (OUTPATIENT)
Dept: PHYSICAL THERAPY | Facility: CLINIC | Age: 68
End: 2023-10-09
Payer: COMMERCIAL

## 2023-10-09 DIAGNOSIS — S46.911D: Primary | ICD-10-CM

## 2023-10-09 DIAGNOSIS — S46.011D TRAUMATIC COMPLETE TEAR OF RIGHT ROTATOR CUFF, SUBSEQUENT ENCOUNTER: ICD-10-CM

## 2023-10-09 DIAGNOSIS — F41.9 ANXIETY: ICD-10-CM

## 2023-10-09 PROCEDURE — 97140 MANUAL THERAPY 1/> REGIONS: CPT | Mod: GP,CQ

## 2023-10-09 PROCEDURE — 97110 THERAPEUTIC EXERCISES: CPT | Mod: GP,CQ

## 2023-10-09 RX ORDER — ALPRAZOLAM 0.5 MG/1
0.5 TABLET ORAL NIGHTLY PRN
Qty: 30 TABLET | Refills: 0 | Status: SHIPPED | OUTPATIENT
Start: 2023-10-09 | End: 2023-11-14 | Stop reason: SDUPTHER

## 2023-10-09 ASSESSMENT — PAIN SCALES - GENERAL: PAINLEVEL_OUTOF10: 3

## 2023-10-09 ASSESSMENT — PAIN - FUNCTIONAL ASSESSMENT: PAIN_FUNCTIONAL_ASSESSMENT: 0-10

## 2023-10-11 ENCOUNTER — TREATMENT (OUTPATIENT)
Dept: PHYSICAL THERAPY | Facility: CLINIC | Age: 68
End: 2023-10-11
Payer: COMMERCIAL

## 2023-10-11 DIAGNOSIS — S46.911D: Primary | ICD-10-CM

## 2023-10-11 DIAGNOSIS — S46.011D TRAUMATIC COMPLETE TEAR OF RIGHT ROTATOR CUFF, SUBSEQUENT ENCOUNTER: ICD-10-CM

## 2023-10-11 PROCEDURE — 97140 MANUAL THERAPY 1/> REGIONS: CPT | Mod: GP | Performed by: PHYSICAL THERAPIST

## 2023-10-11 PROCEDURE — 97110 THERAPEUTIC EXERCISES: CPT | Mod: GP | Performed by: PHYSICAL THERAPIST

## 2023-10-11 ASSESSMENT — ENCOUNTER SYMPTOMS
DEPRESSION: 0
OCCASIONAL FEELINGS OF UNSTEADINESS: 0
LOSS OF SENSATION IN FEET: 0

## 2023-10-11 ASSESSMENT — PAIN SCALES - GENERAL: PAINLEVEL_OUTOF10: 4

## 2023-10-11 ASSESSMENT — PAIN - FUNCTIONAL ASSESSMENT: PAIN_FUNCTIONAL_ASSESSMENT: 0-10

## 2023-10-11 NOTE — PROGRESS NOTES
Physical Therapy    Physical Therapy Treatment    Patient Name: Rodrigo Dill  MRN: [id number]  Today's Date: 10/11/2023  Time Calculation  Start Time: 1055  Stop Time: 1148  Time Calculation (min): 53 min  Visit number: 24   Approved number of visits: 24 (30 requested)  [facility]  [facility]  5/31/23-10/11/23     Assessment:  Patient has completed 24 therapy visits up to this point, and have met 5/10 established therapy goals. The patient has progressed well, and has shown improvements in pain intensity, strength, range of motion, and overall function. At this time, the patient remains below functional baseline with intermittent pain in the shoulder, weakness, and biomechanics of the shoulder girdle. These deficits impair the patient's ability to sleep, dress, bathe, reach, and lift objects. They would benefit from continued skilled therapy at this time to continue to promote functional gains and a return to prior level of function. PT requesting an additional 6 visits prior to discharge to [facility].    PT Assessment  PT Assessment Results: Decreased strength, Decreased range of motion, Decreased endurance, Pain  Rehab Prognosis: Excellent  Evaluation/Treatment Tolerance: Patient tolerated treatment well      Plan:  OP PT Plan  Treatment/Interventions: Aquatic therapy, Cryotherapy, Dry needling, Education/ Instruction, Electrical stimulation, Hot pack, Manual therapy, Neuromuscular re-education, Taping techniques, Therapeutic activities, Therapeutic exercises, Ultrasound, Vasopneumatic device  PT Plan: Skilled PT  PT Frequency: 1 time per week  Duration: 6 visits  Certification Period Start Date: 10/11/23  Certification Period End Date: 01/09/24  Rehab Potential: Excellent  Plan of Care Agreement: Patient  Continue to progress strength and stability of the right upper extremity  Current Problem  1. Rupture of tendon of upper extremity, right, subsequent encounter        2. Traumatic complete tear of right rotator cuff,  "subsequent encounter              Subjective   Pt reports that the shoulder is sore from his last PT session, but otherwise has been feeling good. He notes that pain is about a </= 2/10 at rest, and can get up to a 4/10 with activity. He is still getting woken up 2-3x/week d/t pain.    Precautions  Precautions  STEADI Fall Risk Score (The score of 4 or more indicates an increased risk of falling): 2  Pain  Pain Assessment: 0-10  Pain Score: 4  Pain Location: Shoulder  Pain Orientation: Right    Objective   Shoulder AROM (*indicates pain)  Flexion R 154  Abd R 132  IR R [place] crest*  ER R C2    Shoulder PROM (*indicates pain):   Flexion R 156  Abd R 143  IR R 52*  ER R 72*    Elevation against gravity: 131    Strength:  R shoulder  flex 4/5  Abd 4-/5  IR 4-/5  ER 4-/5  **Pt demonstrates difficulty maintaining constant resistance d/t muscular fatigue**  Posture: able to adopt normal seated posture with minimal to no cueing  Lifting: able to lift 4 lb weight onto a shelf above shoulder height, but demonstrates significant difficulty on last repetition, increased activation of the upper trapezius, and notes increased pain    Outcome Measures:  Other Measures  Disability of Arm Shoulder Hand (DASH): 32; 47.73%      Treatments:  Obj measures and goals review  Therapeutic Exercise  Therapeutic Exercise Activity 1: UBE L3 2' fwd 2' bwd ALT  Therapeutic Exercise Activity 2: Supine shoulder flexion 1# 2x10  Therapeutic Exercise Activity 3: Ball on wall up/down, R/L, CW/CCW x20 ea  Therapeutic Exercise Activity 4: Standing shoulder flexion/scaption/abd 1# x15/15/15  Therapeutic Exercise Activity 5: Serratus punch 3# 2x10  Therapeutic Exercise Activity 6: SL shoulder ER 1# x15 NT  Therapeutic Exercise Activity 7: SL shoulder abd 1# x15 NT  Therapeutic Exercise Activity 8: SL shoulder flexion 1# x15 NT  Therapeutic Exercise Activity 9: Standing Y with lift off 15x3\" NT  Therapeutic Exercise Activity 10: High row BTT 2x10 " NT  [id number]  (33 min)    Manual Therapy  Manual Therapy Activity 1: PROM R shoulder within tissue restrictions  [id number]  (10 min)    CP to R shoulder post treatment x10 min  [id number]  Goals:     1. Patient will demonstrate knowledge and compliance with HEP ONGOING  2. Patient will report decreased pain intensity in R shoulder of 0/10 at rest and </= 2/10 with any activity within the past 1-2 weeks. PROGRESSING  3. Patient will report sleeping through the night without interruption by pain at least 5/7 nights during the past week to promote increased daily function. MET  4. Patient will demonstrate anterior, lateral, and posterior deltoid strength of at least 4+/5 to promote increased function with reaching, bathing, and dressing PROGRESSING  5. Patient will demonstrate R shoulder elevation against gravity of at least 120 degrees for improved ability to perform overhead tasks MET  6. Patient will be able to maintain proper seated posture for at least 10 minutes without external cues from therapist to decrease the likelihood of future shoulder impairment/ injury MET  7. Patient will be able to demonstrate the ability to reach behind his head in order to perform dressing and bathing activities. MET  8. Patient will verbalize understanding of surgical precautions to promote safety and proper healing. MET  9. Patient will report the ability to return to desired recreational activities without significant restriction from the R shoulder such as but not limited to hunting and riding his motorcycle PROGRESSING  10. Updated 8/30/23: Patient will demonstrate the ability to lift a 4lb weight onto a shelf above shoulder height 5x consecutively without reports of increased pain to improve his ability to perform light lifting tasks at home PROGRESSING

## 2023-10-18 ENCOUNTER — APPOINTMENT (OUTPATIENT)
Dept: PHYSICAL THERAPY | Facility: CLINIC | Age: 68
End: 2023-10-18
Payer: COMMERCIAL

## 2023-11-10 DIAGNOSIS — F41.9 ANXIETY: ICD-10-CM

## 2023-11-10 RX ORDER — ALPRAZOLAM 0.5 MG/1
0.5 TABLET ORAL NIGHTLY PRN
Qty: 90 TABLET | Refills: 0 | Status: CANCELLED | OUTPATIENT
Start: 2023-11-10

## 2023-11-14 ENCOUNTER — LAB (OUTPATIENT)
Dept: LAB | Facility: LAB | Age: 68
End: 2023-11-14
Payer: MEDICARE

## 2023-11-14 ENCOUNTER — OFFICE VISIT (OUTPATIENT)
Dept: PRIMARY CARE | Facility: CLINIC | Age: 68
End: 2023-11-14
Payer: MEDICARE

## 2023-11-14 ENCOUNTER — TREATMENT (OUTPATIENT)
Dept: PHYSICAL THERAPY | Facility: CLINIC | Age: 68
End: 2023-11-14
Payer: COMMERCIAL

## 2023-11-14 VITALS
SYSTOLIC BLOOD PRESSURE: 110 MMHG | BODY MASS INDEX: 26.52 KG/M2 | DIASTOLIC BLOOD PRESSURE: 73 MMHG | WEIGHT: 175 LBS | HEIGHT: 68 IN | OXYGEN SATURATION: 95 % | TEMPERATURE: 98.1 F | HEART RATE: 75 BPM

## 2023-11-14 DIAGNOSIS — I10 BENIGN ESSENTIAL HTN: ICD-10-CM

## 2023-11-14 DIAGNOSIS — S46.011D TRAUMATIC COMPLETE TEAR OF RIGHT ROTATOR CUFF, SUBSEQUENT ENCOUNTER: ICD-10-CM

## 2023-11-14 DIAGNOSIS — S46.911D STRAIN OF UNSPECIFIED MUSCLE, FASCIA AND TENDON AT SHOULDER AND UPPER ARM LEVEL, RIGHT ARM, SUBSEQUENT ENCOUNTER: ICD-10-CM

## 2023-11-14 DIAGNOSIS — Z00.00 WELCOME TO MEDICARE PREVENTIVE VISIT: Primary | ICD-10-CM

## 2023-11-14 DIAGNOSIS — F41.9 ANXIETY: ICD-10-CM

## 2023-11-14 DIAGNOSIS — Z12.11 COLON CANCER SCREENING: ICD-10-CM

## 2023-11-14 DIAGNOSIS — E11.9 DIET-CONTROLLED DIABETES MELLITUS (MULTI): ICD-10-CM

## 2023-11-14 DIAGNOSIS — S46.911D: Primary | ICD-10-CM

## 2023-11-14 DIAGNOSIS — Z23 INFLUENZA VACCINE NEEDED: ICD-10-CM

## 2023-11-14 LAB
ANION GAP SERPL CALC-SCNC: 14 MMOL/L (ref 10–20)
BUN SERPL-MCNC: 23 MG/DL (ref 6–23)
CALCIUM SERPL-MCNC: 10.3 MG/DL (ref 8.6–10.6)
CHLORIDE SERPL-SCNC: 104 MMOL/L (ref 98–107)
CO2 SERPL-SCNC: 26 MMOL/L (ref 21–32)
CREAT SERPL-MCNC: 1.01 MG/DL (ref 0.5–1.3)
CREAT UR-MCNC: 59.1 MG/DL (ref 20–370)
EST. AVERAGE GLUCOSE BLD GHB EST-MCNC: 169 MG/DL
GFR SERPL CREATININE-BSD FRML MDRD: 82 ML/MIN/1.73M*2
GLUCOSE SERPL-MCNC: 149 MG/DL (ref 74–99)
HBA1C MFR BLD: 7.5 %
MICROALBUMIN UR-MCNC: <7 MG/L
MICROALBUMIN/CREAT UR: NORMAL MG/G{CREAT}
POTASSIUM SERPL-SCNC: 5 MMOL/L (ref 3.5–5.3)
SODIUM SERPL-SCNC: 139 MMOL/L (ref 136–145)

## 2023-11-14 PROCEDURE — 82043 UR ALBUMIN QUANTITATIVE: CPT

## 2023-11-14 PROCEDURE — 90662 IIV NO PRSV INCREASED AG IM: CPT | Performed by: INTERNAL MEDICINE

## 2023-11-14 PROCEDURE — 3078F DIAST BP <80 MM HG: CPT | Performed by: INTERNAL MEDICINE

## 2023-11-14 PROCEDURE — G0402 INITIAL PREVENTIVE EXAM: HCPCS | Performed by: INTERNAL MEDICINE

## 2023-11-14 PROCEDURE — 1125F AMNT PAIN NOTED PAIN PRSNT: CPT | Performed by: INTERNAL MEDICINE

## 2023-11-14 PROCEDURE — 1159F MED LIST DOCD IN RCRD: CPT | Performed by: INTERNAL MEDICINE

## 2023-11-14 PROCEDURE — 1170F FXNL STATUS ASSESSED: CPT | Performed by: INTERNAL MEDICINE

## 2023-11-14 PROCEDURE — 1036F TOBACCO NON-USER: CPT | Performed by: INTERNAL MEDICINE

## 2023-11-14 PROCEDURE — 4010F ACE/ARB THERAPY RXD/TAKEN: CPT | Performed by: INTERNAL MEDICINE

## 2023-11-14 PROCEDURE — 82570 ASSAY OF URINE CREATININE: CPT

## 2023-11-14 PROCEDURE — 83036 HEMOGLOBIN GLYCOSYLATED A1C: CPT

## 2023-11-14 PROCEDURE — 80048 BASIC METABOLIC PNL TOTAL CA: CPT

## 2023-11-14 PROCEDURE — 3074F SYST BP LT 130 MM HG: CPT | Performed by: INTERNAL MEDICINE

## 2023-11-14 PROCEDURE — 3052F HG A1C>EQUAL 8.0%<EQUAL 9.0%: CPT | Performed by: INTERNAL MEDICINE

## 2023-11-14 PROCEDURE — G0008 ADMIN INFLUENZA VIRUS VAC: HCPCS | Performed by: INTERNAL MEDICINE

## 2023-11-14 PROCEDURE — 97110 THERAPEUTIC EXERCISES: CPT | Mod: GP | Performed by: PHYSICAL THERAPIST

## 2023-11-14 PROCEDURE — 97140 MANUAL THERAPY 1/> REGIONS: CPT | Mod: GP | Performed by: PHYSICAL THERAPIST

## 2023-11-14 PROCEDURE — 36415 COLL VENOUS BLD VENIPUNCTURE: CPT

## 2023-11-14 RX ORDER — ALPRAZOLAM 0.5 MG/1
0.5 TABLET ORAL NIGHTLY PRN
Qty: 90 TABLET | Refills: 0 | Status: SHIPPED | OUTPATIENT
Start: 2023-11-14 | End: 2024-02-16 | Stop reason: SDUPTHER

## 2023-11-14 ASSESSMENT — ACTIVITIES OF DAILY LIVING (ADL)
BATHING: INDEPENDENT
TAKING_MEDICATION: INDEPENDENT
MANAGING_FINANCES: INDEPENDENT
GROCERY_SHOPPING: INDEPENDENT
DRESSING: INDEPENDENT
DOING_HOUSEWORK: INDEPENDENT

## 2023-11-14 ASSESSMENT — PAIN - FUNCTIONAL ASSESSMENT: PAIN_FUNCTIONAL_ASSESSMENT: 0-10

## 2023-11-14 ASSESSMENT — PATIENT HEALTH QUESTIONNAIRE - PHQ9
SUM OF ALL RESPONSES TO PHQ9 QUESTIONS 1 AND 2: 0
1. LITTLE INTEREST OR PLEASURE IN DOING THINGS: NOT AT ALL
2. FEELING DOWN, DEPRESSED OR HOPELESS: NOT AT ALL

## 2023-11-14 ASSESSMENT — ENCOUNTER SYMPTOMS
DEPRESSION: 0
OCCASIONAL FEELINGS OF UNSTEADINESS: 0
LOSS OF SENSATION IN FEET: 0

## 2023-11-14 ASSESSMENT — PAIN SCALES - GENERAL: PAINLEVEL_OUTOF10: 0 - NO PAIN

## 2023-11-14 NOTE — PROGRESS NOTES
Physical Therapy    Physical Therapy Treatment    Patient Name: Rodrigo Dill  MRN: [id number]  Today's Date: 11/14/2023  Time Calculation  Start Time: 1514  Stop Time: 1557  Time Calculation (min): 43 min  Visit number: 24   Approved number of visits: 36  [facility]  100% UCR  5/31/23-12/9/23    Assessment:  Patient returns to OPPT following a 5 week break while further approval of visits was obtained. Patient demonstrates improved range of motion of the right shoulder both actively and passively, and has met goal ranges on previous sessions. However, strength and mechanics of the right shoulder remain below goal levels, and patient continues to report difficulty with lifting tasks, especially above the shoulder/head. Therapeutic exercises performed this date focus on increasing strength and stability of the right shoulder, and patient reports increased fatigue and muscular soreness following treatment. He is progressing well at this time, and remains a good candidate for additional skilled PT.     PT Assessment  PT Assessment Results: Decreased strength, Decreased range of motion, Decreased endurance, Pain  Rehab Prognosis: Excellent      Plan:  OP PT Plan  Treatment/Interventions: Aquatic therapy, Cryotherapy, Dry needling, Education/ Instruction, Electrical stimulation, Hot pack, Manual therapy, Neuromuscular re-education, Taping techniques, Therapeutic activities, Therapeutic exercises, Ultrasound, Vasopneumatic device  PT Plan: Skilled PT  PT Frequency: 1 time per week  Duration: 6 visits  Certification Period Start Date: 10/11/23  Certification Period End Date: 01/09/24  Rehab Potential: Excellent  Plan of Care Agreement: Patient  Continue to progress strength and stability of the right upper extremity  Current Problem  1. Rupture of tendon of upper extremity, right, subsequent encounter        2. Traumatic complete tear of right rotator cuff, subsequent encounter        3. Strain of unspecified muscle,  "fascia and tendon at shoulder and upper arm level, right arm, subsequent encounter  Referral to Physical Therapy              Subjective   Patient reports that the shoulder has been feeling ok, but he hasn't been doing all of the exercises. He notes that he has been working out in the yard however. Denies pain at start of session.     Precautions  Precautions  STEADI Fall Risk Score (The score of 4 or more indicates an increased risk of falling): 2  Pain  Pain Assessment: 0-10  Pain Score: 0 - No pain  Pain Location: Shoulder  Pain Orientation: Right    Objective   Shoulder AROM (*indicates pain)  Flexion R 155  Abd R 140  IR R [place] crest*  ER R C2    Shoulder PROM (*indicates pain):   Flexion R 158  Abd R 143  IR R 56  ER R 77    Strength:  R shoulder  flex 4/5  Abd 4/5  IR 4/5  ER 4/5  Posture: able to adopt normal seated posture with minimal to no cueing  Lifting: NA    Outcome Measures:     NA this date    Treatments:  Obj measures and goals review  Therapeutic Exercise  Therapeutic Exercise Activity 1: UBE L3.5 2' fwd 2' bwd ALT  Therapeutic Exercise Activity 2: Supine shoulder flexion 1# 2x10 NT  Therapeutic Exercise Activity 3: Ball on wall up/down, R/L, CW/CCW x20 ea  Therapeutic Exercise Activity 4: Standing shoulder flexion/scaption/abd 1# x15/15/15  Therapeutic Exercise Activity 5: Serratus punch 3# 2x10 NT  Therapeutic Exercise Activity 6: SL shoulder ER 1# x15  Therapeutic Exercise Activity 7: SL shoulder abd 1# x15  Therapeutic Exercise Activity 8: SL shoulder flexion 1# x15  Therapeutic Exercise Activity 9: Standing Y with lift off 15x3\"  Therapeutic Exercise Activity 10: High row BTT 2x10 NT  Therapeutic Exercise Activity 11: Standing shoulder flexion GTT 2x10  Therapeutic Exercise Activity 12: Wall washes one arm x15  Therapeutic Exercise Activity 13: Theraband clocks YTB x2  [id number]  (29')    Manual Therapy  Manual Therapy Activity 1: PROM R shoulder within tissue " restrictions  [id number]  (13')    Goals:     1. Patient will demonstrate knowledge and compliance with HEP ONGOING  2. Patient will report decreased pain intensity in R shoulder of 0/10 at rest and </= 2/10 with any activity within the past 1-2 weeks. NOT ASSESSED  3. Patient will report sleeping through the night without interruption by pain at least 5/7 nights during the past week to promote increased daily function. NOT ASSESSED  4. Patient will demonstrate anterior, lateral, and posterior deltoid strength of at least 4+/5 to promote increased function with reaching, bathing, and dressing PROGRESSING  5. Patient will demonstrate R shoulder elevation against gravity of at least 120 degrees for improved ability to perform overhead tasks NOT ASSESSED  6. Patient will be able to maintain proper seated posture for at least 10 minutes without external cues from therapist to decrease the likelihood of future shoulder impairment/ injury MET  7. Patient will be able to demonstrate the ability to reach behind his head in order to perform dressing and bathing activities. MET  8. Patient will verbalize understanding of surgical precautions to promote safety and proper healing. NOT ASSESSED  9. Patient will report the ability to return to desired recreational activities without significant restriction from the R shoulder such as but not limited to hunting and riding his motorcycle PROGRESSING  10. Updated 8/30/23: Patient will demonstrate the ability to lift a 4lb weight onto a shelf above shoulder height 5x consecutively without reports of increased pain to improve his ability to perform light lifting tasks at home NOT ASSSESSED

## 2023-11-14 NOTE — PROGRESS NOTES
SUBJECTIVE:   Rodrigo Dill is a 67 y.o. male presenting for his annual physical/wellness.  PCP: Neema Antony MD  Medicare wellness welcome, and med refills.    [facility]:  In the past year, patient experienced:     One or more falls in the last year: No     Depression Screen as of today    Over the past 2 weeks, how often have you been bothered by any of the following problems?   Little interest or pleasure in doing things Not at all   Feeling down, depressed, or hopeless Not at all   Patient Health Questionnaire-2 Score 0   Over the past 2 weeks, how often have you been bothered by any of the following problems?     C-SSRS from encounters over the past 365 days    No data recorded  Anxiety Screening from encounters over the past 365 days    No data recorded  Alcohol Screening from encounters over the past 365 days    No data recorded  Drug Screening from encounters over the past 365 days    No data recorded  General Health - Medicare Wellness as of today    Patient Self Assessment of Health Status   Patient Self Assessment Excellent     Nutrition and Exercise as of today    Nutrition and Exercise   Current Diet Heart Healthy Diet   Adequate Fluid Intake No   Caffeine Yes   Exercise Frequency Regularly     Functional Ability/Level of Safety as of today    Functional Ability/Level of Safety   Cognitive Impairment Observed No cognitive impairment observed   Cognitive Impairment Reported No cognitive impairment reported by patient or family     Home Safety Risk Factors as of today    Home Safety Risk Factors None     ADLs/IADLs - Medicare as of today    ADL Screening   Hearing - Right Ear Hearing aid   Hearing - Left Ear Hearing aid   Bathing Independent   Dressing Independent   Walks in Home Independent   IADL's   Managing Finances Independent   Grocery Shopping Independent   Taking Medication Independent   Doing Housework Independent     Vision Screening  Edited by: Kamari Calzada MA    "Right eye Left eye Both eyes   Without correction 20/25 20/25 20/20     Colon screening: ?cologuard  Prostate screening:   Lab Results   Component Value Date    PSA 0.43 01/23/2021     Vaccines: missing one dose of shingrix. Utd with pneumonia vaccines. Getting flu shot today. Recommended RSV, covid booster. He will look up on his record from [employer], and get second dose of shingrix from drug store  Diet:   healthy.  Exercises:   regularly.  Checks bs, regularly, 120 or so, improved since he got over from surgery and steroid.      Lab Results   Component Value Date    HGBA1C 8.4 (A) 08/09/2023     Lab Results   Component Value Date    CREATININE 1.08 08/09/2023     Lab Results   Component Value Date    CHOL 192 08/09/2023    CHOL 171 12/22/2021    CHOL 168 01/23/2021     Lab Results   Component Value Date    HDL 45.0 08/09/2023    HDL 38.4 (A) 12/22/2021    HDL 44.3 01/23/2021     No results found for: \"LDLCALC\"  Lab Results   Component Value Date    TRIG 152 (H) 08/09/2023    TRIG 89 01/23/2021     No components found for: \"CHOLHDL\"    ROS:  Feeling well. No dyspnea or chest pain on exertion. No abdominal pain, change in bowel habits, black or bloody stools. No urinary tract or prostatic symptoms. No neurological complaints.      OBJECTIVE:   The patient appears well, alert, oriented x 3, in no distress.   /73   Pulse 75   Temp 36.7 °C (98.1 °F)   Ht 1.727 m (5' 8\")   Wt 79.4 kg (175 lb)   SpO2 95%   BMI 26.61 kg/m²   ENT normal.  Neck supple. No adenopathy or thyromegaly. DORIS. Lungs are clear, good air entry, no wheezes, rhonchi or rales. S1 and S2 normal, no murmurs, regular rate and rhythm. Abdomen is soft without tenderness, guarding, mass or organomegaly. Extremities show no edema, normal peripheral pulses. Neurological is normal without focal findings.      1. Welcome to Medicare preventive visit        2. Influenza vaccine needed  Flu vaccine, quadrivalent, high-dose, preservative free, age 65y+ " (FLUZONE)      3. Diet-controlled diabetes mellitus (CMS/HCC)  Albumin , Urine Random      4. Benign essential HTN            OARRS:  Neema Antony MD on 11/14/2023 11:11 AM  I have personally reviewed the OARRS report for Rodrigo Dill. I have considered the risks of abuse, dependence, addiction and diversion    Is the patient prescribed a combination of a benzodiazepine and opioid?  No    Last Urine Drug Screen / ordered today: Yes  Recent Results (from the past 8760 hour(s))   OPIATE/OPIOID/BENZO PRESCRIPTION COMPLIANCE    Collection Time: 01/16/23 11:37 AM   Result Value Ref Range    DRUG SCREEN COMMENT URINE SEE BELOW     Creatine, Urine 46.5 mg/dL    Amphetamine Screen, Urine PRESUMPTIVE NEGATIVE NEGATIVE    Barbiturate Screen, Urine PRESUMPTIVE NEGATIVE NEGATIVE    Cannabinoid Screen, Urine PRESUMPTIVE NEGATIVE NEGATIVE    Cocaine Screen, Urine PRESUMPTIVE NEGATIVE NEGATIVE    PCP Screen, Urine PRESUMPTIVE NEGATIVE NEGATIVE    7-Aminoclonazepam <25 Cutoff <25 ng/mL    Alpha-Hydroxyalprazolam <25 Cutoff <25 ng/mL    Alpha-Hydroxymidazolam <25 Cutoff <25 ng/mL    Alprazolam <25 Cutoff <25 ng/mL    Chlordiazepoxide <25 Cutoff <25 ng/mL    Clonazepam <25 Cutoff <25 ng/mL    Diazepam <25 Cutoff <25 ng/mL    Lorazepam <25 Cutoff <25 ng/mL    Midazolam <25 Cutoff <25 ng/mL    Nordiazepam <25 Cutoff <25 ng/mL    Oxazepam <25 Cutoff <25 ng/mL    Temazepam <25 Cutoff <25 ng/mL    Zolpidem <25 Cutoff <25 ng/mL    Zolpidem Metabolite (ZCA) <25 Cutoff <25 ng/mL    6-Acetylmorphine <25 Cutoff <25 ng/mL    Codeine <50 Cutoff <50 ng/mL    Hydrocodone <25 Cutoff <25 ng/mL    Hydromorphone <25 Cutoff <25 ng/mL    Morphine Urine <50 Cutoff <50 ng/mL    Norhydrocodone <25 Cutoff <25 ng/mL    Noroxycodone <25 Cutoff <25 ng/mL    Oxycodone <25 Cutoff <25 ng/mL    Oxymorphone <25 Cutoff <25 ng/mL    Tramadol >1000 (A) Cutoff <50 ng/mL    O-Desmethyltramadol >1000 (A) Cutoff <50 ng/mL    Fentanyl <2.5 Cutoff<2.5 ng/mL    Norfentanyl  <2.5 Cutoff<2.5 ng/mL    METHADONE CONFIRMATION,URINE <25 Cutoff <25 ng/mL    EDDP <25 Cutoff <25 ng/mL     Results are as expected.         Controlled Substance Agreement:  Date of the Last Agreement: 1/2023  Reviewed Controlled Substance Agreement including but not limited to the benefits, risks, and alternatives to treatment with a Controlled Substance medication(s).    Benzodiazepines:  What is the patient's goal of therapy? Anxiety control  Is this being achieved with current treatment? yes    Activities of Daily Living:   Is your overall impression that this patient is benefiting (symptom reduction outweighs side effects) from benzodiazepine therapy? Yes     1. Physical Functioning: Better  2. Family Relationship: Better  3. Social Relationship: Better  4. Mood: Better  5. Sleep Patterns: Better  6. Overall Function: Better

## 2023-11-20 ENCOUNTER — TREATMENT (OUTPATIENT)
Dept: PHYSICAL THERAPY | Facility: CLINIC | Age: 68
End: 2023-11-20
Payer: COMMERCIAL

## 2023-11-20 DIAGNOSIS — S46.911D: Primary | ICD-10-CM

## 2023-11-20 DIAGNOSIS — S46.011D TRAUMATIC COMPLETE TEAR OF RIGHT ROTATOR CUFF, SUBSEQUENT ENCOUNTER: ICD-10-CM

## 2023-11-20 DIAGNOSIS — S46.911D STRAIN OF UNSPECIFIED MUSCLE, FASCIA AND TENDON AT SHOULDER AND UPPER ARM LEVEL, RIGHT ARM, SUBSEQUENT ENCOUNTER: ICD-10-CM

## 2023-11-20 PROCEDURE — 97140 MANUAL THERAPY 1/> REGIONS: CPT | Mod: GP,CQ

## 2023-11-20 PROCEDURE — 97010 HOT OR COLD PACKS THERAPY: CPT | Mod: GP,CQ

## 2023-11-20 PROCEDURE — 97110 THERAPEUTIC EXERCISES: CPT | Mod: GP,CQ

## 2023-11-20 ASSESSMENT — PAIN - FUNCTIONAL ASSESSMENT: PAIN_FUNCTIONAL_ASSESSMENT: 0-10

## 2023-11-20 ASSESSMENT — PAIN SCALES - GENERAL: PAINLEVEL_OUTOF10: 4

## 2023-11-20 NOTE — PROGRESS NOTES
"Physical Therapy    Physical Therapy Treatment    Patient Name: Rodrigo Dill  MRN: [id number]  Today's Date: 11/20/2023  Time Calculation  Start Time: 0931  Stop Time: 1039  Time Calculation (min): 68 min  Visit number: 25  Approved number of visits: 36  [facility]  100% UCR  5/31/23-12/9/23    Assessment:  Patient demonstrates improved range of motion of the right shoulder both actively and passively.  However, strength and mechanics of the right shoulder still remain below goal levels, and patient continues to report difficulty with lifting tasks, especially above the shoulder/head. Therapeutic exercises performed this date focus on increasing strength and stability of the right shoulder, and patient reports increased fatigue and muscular soreness following treatment. Notes increased soreness during there ex which decreased after CP. No skin abnormality noted after modalities.  He is progressing well at this time, and remains a good candidate for additional skilled PT.     PT Assessment  Evaluation/Treatment Tolerance: Patient limited by fatigue      Plan:  OP PT Plan  PT Plan: Skilled PT  Continue to progress strength and stability of the right upper extremity    Current Problem  1. Rupture of tendon of upper extremity, right, subsequent encounter        2. Traumatic complete tear of right rotator cuff, subsequent encounter        3. Strain of unspecified muscle, fascia and tendon at shoulder and upper arm level, right arm, subsequent encounter              Subjective   Patient reports that the shoulder has been feeling ok, and he has been doing all of the exercises except for one day. Notes \"it's just mostly sore and weak.\"    Precautions  Precautions  STEADI Fall Risk Score (The score of 4 or more indicates an increased risk of falling): 2  Pain  Pain Assessment: 0-10  Pain Score: 4  Pain Location: Shoulder  Pain Orientation: Right4/10    Objective       Treatments: 9:45a-10:20a  Therapeutic Exercise Activity " "1: UBE L2 3' fwd 3' bwd ALT >45Watts  Therapeutic Exercise Activity 2: Supine shoulder flexion 1# 2x10 NT  Therapeutic Exercise Activity 3: Ball on wall up/down, R/L, CW/CCW x20 ea  Therapeutic Exercise Activity 4: Standing shoulder flexion/scaption/abd 2# x10ea  Therapeutic Exercise Activity 5: Serratus punch 3# 2x10 NT  Therapeutic Exercise Activity 6: SL shoulder ER 1# x15 NT  Therapeutic Exercise Activity 7: SL shoulder abd 1# x15 NT  Therapeutic Exercise Activity 8: SL shoulder flexion 1# x15 NT  Therapeutic Exercise Activity 9: Standing Y with lift off 15x3\"  Therapeutic Exercise Activity 10: High row BTT 2x10 NT  Therapeutic Exercise Activity 11: Standing shoulder flexion GTT 2x10  Therapeutic Exercise Activity 12: Wall washes one arm x15  Therapeutic Exercise Activity 13: Theraband clocks YTB x2 NT  Lat Stretch 2x30\"ea  Door Pec stretch 3x30\" mid  Pulley flex and ABD 2'ea  Wall push ups 2x10  Prone I,T,Y,row, ext 2/ 0# x10ea  Bicep Curl 2 way 6# x15ea  Tricep Ext PurpleTT x10ea  LAE PurpleTT x10ea       Manual: 9:32-9:44a  R scapular lat and superior METs to increase scapulohumeral rhythm. TPR to R lats.    Modalities: 10:22-10:32a  CP to R shoulder after tx.      Goals:     1. Patient will demonstrate knowledge and compliance with HEP ONGOING  2. Patient will report decreased pain intensity in R shoulder of 0/10 at rest and </= 2/10 with any activity within the past 1-2 weeks. NOT ASSESSED  3. Patient will report sleeping through the night without interruption by pain at least 5/7 nights during the past week to promote increased daily function. NOT ASSESSED  4. Patient will demonstrate anterior, lateral, and posterior deltoid strength of at least 4+/5 to promote increased function with reaching, bathing, and dressing PROGRESSING  5. Patient will demonstrate R shoulder elevation against gravity of at least 120 degrees for improved ability to perform overhead tasks NOT ASSESSED  6. Patient will be able to " maintain proper seated posture for at least 10 minutes without external cues from therapist to decrease the likelihood of future shoulder impairment/ injury MET  7. Patient will be able to demonstrate the ability to reach behind his head in order to perform dressing and bathing activities. MET  8. Patient will verbalize understanding of surgical precautions to promote safety and proper healing. NOT ASSESSED  9. Patient will report the ability to return to desired recreational activities without significant restriction from the R shoulder such as but not limited to hunting and riding his motorcycle PROGRESSING  10. Updated 8/30/23: Patient will demonstrate the ability to lift a 4lb weight onto a shelf above shoulder height 5x consecutively without reports of increased pain to improve his ability to perform light lifting tasks at home NOT ASSSESSED

## 2023-11-21 ENCOUNTER — OFFICE VISIT (OUTPATIENT)
Dept: ORTHOPEDIC SURGERY | Facility: CLINIC | Age: 68
End: 2023-11-21
Payer: COMMERCIAL

## 2023-11-21 VITALS — WEIGHT: 177 LBS | HEIGHT: 68 IN | BODY MASS INDEX: 26.83 KG/M2

## 2023-11-21 DIAGNOSIS — M25.511 CHRONIC RIGHT SHOULDER PAIN: Primary | ICD-10-CM

## 2023-11-21 DIAGNOSIS — G89.29 CHRONIC RIGHT SHOULDER PAIN: Primary | ICD-10-CM

## 2023-11-21 PROCEDURE — 99213 OFFICE O/P EST LOW 20 MIN: CPT | Performed by: ORTHOPAEDIC SURGERY

## 2023-11-21 NOTE — PROGRESS NOTES
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

## 2023-11-22 ENCOUNTER — TREATMENT (OUTPATIENT)
Dept: PHYSICAL THERAPY | Facility: CLINIC | Age: 68
End: 2023-11-22
Payer: COMMERCIAL

## 2023-11-22 DIAGNOSIS — S46.011D TRAUMATIC COMPLETE TEAR OF RIGHT ROTATOR CUFF, SUBSEQUENT ENCOUNTER: ICD-10-CM

## 2023-11-22 DIAGNOSIS — S46.911D STRAIN OF UNSPECIFIED MUSCLE, FASCIA AND TENDON AT SHOULDER AND UPPER ARM LEVEL, RIGHT ARM, SUBSEQUENT ENCOUNTER: ICD-10-CM

## 2023-11-22 DIAGNOSIS — S46.911D: Primary | ICD-10-CM

## 2023-11-22 PROCEDURE — 97110 THERAPEUTIC EXERCISES: CPT | Mod: GP | Performed by: PHYSICAL THERAPIST

## 2023-11-22 ASSESSMENT — ENCOUNTER SYMPTOMS
LOSS OF SENSATION IN FEET: 0
DEPRESSION: 0
OCCASIONAL FEELINGS OF UNSTEADINESS: 0

## 2023-11-22 ASSESSMENT — PAIN SCALES - GENERAL: PAINLEVEL_OUTOF10: 0 - NO PAIN

## 2023-11-22 ASSESSMENT — PAIN - FUNCTIONAL ASSESSMENT: PAIN_FUNCTIONAL_ASSESSMENT: 0-10

## 2023-11-22 NOTE — PROGRESS NOTES
Physical Therapy    Physical Therapy Treatment    Patient Name: Rodrigo Dill  MRN: [id number]  Today's Date: 11/22/2023  Time Calculation  Start Time: 1015  Stop Time: 1104  Time Calculation (min): 49 min  Visit number: 26  POC: 30   Approved number of visits: 36  [facility]  100% UCR  5/31/23-12/9/23    Assessment:  Treatment session focuses on therapeutic exercises increasing strength and stability of the right shoulder. ROM of the shoulder joint has progressed well, and no longer needs to be a primary focus for PT. Patient challenged with rhythmic stabilization exercise, and reports increased muscular fatigue. He is able to increase weight for stand shoulder elevation exercises, but significant increase in shoulder fatigue is found afterwards. Patient is encouraged to continue to work on his exercises at home to progress further towards prior level of function. He is progressing well, and remains a good candidate for additional skilled PT at this time.   PT Assessment  PT Assessment Results: Decreased strength, Decreased endurance, Pain  Rehab Prognosis: Excellent  Evaluation/Treatment Tolerance: Patient limited by fatigue      Plan:  OP PT Plan  PT Plan: Skilled PT  Rehab Potential: Excellent  Plan of Care Agreement: Patient  Continue to progress strength and stability of the right upper extremity  Current Problem  1. Rupture of tendon of upper extremity, right, subsequent encounter        2. Traumatic complete tear of right rotator cuff, subsequent encounter        3. Strain of unspecified muscle, fascia and tendon at shoulder and upper arm level, right arm, subsequent encounter                  Subjective   Patient reports that he saw the surgeon earlier who was happy with his progress and stated that he was released to do anything he wanted as long as it didn't cause pain. He notes that he has been keeping up with his exercises much more consistently.     Precautions  Precautions  STEADI Fall Risk Score (The  "score of 4 or more indicates an increased risk of falling): 2  Pain  Pain Assessment: 0-10  Pain Score: 0 - No pain    Objective   Mild scapular winging and upper trapezius compensation during shoulder abduction-improving from previous sessions      Treatments:  [id number]  Therapeutic Exercise  Therapeutic Exercise Activity 1: UBE L3.5 2' fwd 2' bwd ALT >/= 45  Therapeutic Exercise Activity 2: Supine shoulder flexion 1# 2x10 NT  Therapeutic Exercise Activity 3: Ball on wall up/down, R/L, CW/CCW small med ball x20 ea  Therapeutic Exercise Activity 4: Standing shoulder flexion/scaption/abd 2# x10 ea  Therapeutic Exercise Activity 5: Serratus punch 3# 2x10 NT  Therapeutic Exercise Activity 6: SL shoulder ER 1# x15 NT  Therapeutic Exercise Activity 7: SL shoulder abd 1# x15 NT  Therapeutic Exercise Activity 8: SL shoulder flexion 2# x10 NT  Therapeutic Exercise Activity 9: Standing Y with lift off 15x3\"  Therapeutic Exercise Activity 10: High row BTT 2x10 NT  Therapeutic Exercise Activity 11: Standing shoulder flexion GTT 2x10 NT  Therapeutic Exercise Activity 12: Wall washes one arm x15  Therapeutic Exercise Activity 13: Wall ladder flexion/abduction x10 ea  Therapeutic Exercise Activity 14: Pulleys scpation x2'  Therapeutic Exercise Activity 15: SB rotations CW/CCW x15 ea  Therapeutic Exercise Activity 16: SB rhythmic stabilization 3x30\"  Therapeutic Exercise Activity 17: D1 extension GTT 2x10  (39')    CP to right shoulder following exercise x10'   [id number]  Goals:   By discharge  1. Patient will demonstrate knowledge and compliance with HEP ONGOING  2. Patient will report decreased pain intensity in R shoulder of 0/10 at rest and </= 2/10 with any activity within the past 1-2 weeks. NOT ASSESSED  3. Patient will report sleeping through the night without interruption by pain at least 5/7 nights during the past week to promote increased daily function. NOT ASSESSED  4. Patient will demonstrate anterior, lateral, and " posterior deltoid strength of at least 4+/5 to promote increased function with reaching, bathing, and dressing PROGRESSING  5. Patient will demonstrate R shoulder elevation against gravity of at least 120 degrees for improved ability to perform overhead tasks NOT ASSESSED  6. Patient will be able to maintain proper seated posture for at least 10 minutes without external cues from therapist to decrease the likelihood of future shoulder impairment/ injury MET  7. Patient will be able to demonstrate the ability to reach behind his head in order to perform dressing and bathing activities. MET  8. Patient will verbalize understanding of surgical precautions to promote safety and proper healing. NOT ASSESSED  9. Patient will report the ability to return to desired recreational activities without significant restriction from the R shoulder such as but not limited to hunting and riding his motorcycle PROGRESSING  10. Updated 8/30/23: Patient will demonstrate the ability to lift a 4lb weight onto a shelf above shoulder height 5x consecutively without reports of increased pain to improve his ability to perform light lifting tasks at home NOT ASSSESSED

## 2023-11-27 ENCOUNTER — TREATMENT (OUTPATIENT)
Dept: PHYSICAL THERAPY | Facility: CLINIC | Age: 68
End: 2023-11-27
Payer: COMMERCIAL

## 2023-11-27 DIAGNOSIS — S46.011D TRAUMATIC COMPLETE TEAR OF RIGHT ROTATOR CUFF, SUBSEQUENT ENCOUNTER: ICD-10-CM

## 2023-11-27 DIAGNOSIS — S46.911D STRAIN OF UNSPECIFIED MUSCLE, FASCIA AND TENDON AT SHOULDER AND UPPER ARM LEVEL, RIGHT ARM, SUBSEQUENT ENCOUNTER: ICD-10-CM

## 2023-11-27 DIAGNOSIS — S46.911D: Primary | ICD-10-CM

## 2023-11-27 PROCEDURE — 97110 THERAPEUTIC EXERCISES: CPT | Mod: GP,CQ

## 2023-11-27 PROCEDURE — 97140 MANUAL THERAPY 1/> REGIONS: CPT | Mod: GP,CQ

## 2023-11-27 ASSESSMENT — PAIN - FUNCTIONAL ASSESSMENT: PAIN_FUNCTIONAL_ASSESSMENT: 0-10

## 2023-11-27 ASSESSMENT — PAIN SCALES - GENERAL: PAINLEVEL_OUTOF10: 0 - NO PAIN

## 2023-11-27 NOTE — PROGRESS NOTES
Physical Therapy    Physical Therapy Treatment    Patient Name: Rodrigo Dill  MRN: [id number]  Today's Date: 11/27/2023  Time Calculation  Start Time: 1012  Stop Time: 1055  Time Calculation (min): 43 min  Visit number: 27  POC: 30   Approved number of visits: 36  [facility]  100% UCR  5/31/23-12/9/23    Assessment:  Treatment session focuses on therapeutic exercises increasing strength and stability of the right shoulder. ROM of the shoulder joint has progressed well, and no longer needs to be a primary focus for PT. Patient challenged with added therapeutic ex and reports increased muscular fatigue. Patient is encouraged to continue to work on his exercises at home to progress further towards prior level of function. He is progressing well, and remains a good candidate for additional skilled PT at this time.      Plan:  OP PT Plan  PT Plan: Skilled PT  Continue to progress strength and stability of the right upper extremity  Current Problem  1. Rupture of tendon of upper extremity, right, subsequent encounter        2. Traumatic complete tear of right rotator cuff, subsequent encounter        3. Strain of unspecified muscle, fascia and tendon at shoulder and upper arm level, right arm, subsequent encounter            Subjective   Patient reports that used a nail gun for about 5 hours to put up a frame on Saturday and just a little sore no pain though. He notes that he has been keeping up with his exercises much more consistently.     Precautions  Precautions  STEADI Fall Risk Score (The score of 4 or more indicates an increased risk of falling): 2  Pain  Pain Assessment: 0-10  Pain Score: 0 - No pain  Pain Location: Shoulder  Pain Orientation: Right    Objective   R ER shoulder strength = 4+/5.    Treatments:   Therapeutic Ex: 1023a-1055a  Therapeutic Exercise Activity 1: UBE L3.5 2' fwd 2' bwd ALT >/= 50RPM  Therapeutic Exercise Activity 2: Supine shoulder flexion 1# 2x10 NT  Therapeutic Exercise Activity 3:  "Ball on wall up/down, R/L, CW/CCW small med ball x20 ea  Therapeutic Exercise Activity 4: Standing shoulder flexion/scaption/abd 2# x10 ea  Therapeutic Exercise Activity 5: Serratus punch 3# 2x10 NT  Therapeutic Exercise Activity 6: SL shoulder ER 1# x15 NT  Therapeutic Exercise Activity 7: SL shoulder abd 0# x10  Therapeutic Exercise Activity 8: SL shoulder flexion 0# x10  Therapeutic Exercise Activity 9: Standing Y with lift off 15x3\"  Therapeutic Exercise Activity 10: High row BTT 2x10 NT  Therapeutic Exercise Activity 11: Standing shoulder flexion GTT 2x10 NT  Therapeutic Exercise Activity 12: Wall washes one arm x15 NT  Therapeutic Exercise Activity 13: Wall ladder flexion/abduction x10 ea  Therapeutic Exercise Activity 14: Pulleys scpation x2'  Therapeutic Exercise Activity 15: SB rotations CW/CCW x15 ea medium medicine ball B and sm R UE  Therapeutic Exercise Activity 16: SB rhythmic stabilization 5x30\"  Therapeutic Exercise Activity 17: D1 extension GTT 2x10  Lat Stretch 2x30\"ea  Door Pec Stretch mid 3x30\"  Tricep Ext 3 way BTT x15ea  Wall pu w/ and w/o RSB x15ea  LAE BTT 2x15  Lower Trap SilverTT x20  lift a 8lb weight onto a shelf above shoulder height 2x     Manual: 10:13-10:23a  R scapular jt mob (gr3) to increase scapulohumoral rhythm. TPR to R lats/ STM to biceps.     Declined CP this date.     Goals:   By discharge  1. Patient will demonstrate knowledge and compliance with HEP ONGOING  2. Patient will report decreased pain intensity in R shoulder of 0/10 at rest and </= 2/10 with any activity within the past 1-2 weeks. NOT ASSESSED  3. Patient will report sleeping through the night without interruption by pain at least 5/7 nights during the past week to promote increased daily function. NOT ASSESSED  4. Patient will demonstrate anterior, lateral, and posterior deltoid strength of at least 4+/5 to promote increased function with reaching, bathing, and dressing PROGRESSING  5. Patient will demonstrate R " shoulder elevation against gravity of at least 120 degrees for improved ability to perform overhead tasks NOT ASSESSED  6. Patient will be able to maintain proper seated posture for at least 10 minutes without external cues from therapist to decrease the likelihood of future shoulder impairment/ injury MET  7. Patient will be able to demonstrate the ability to reach behind his head in order to perform dressing and bathing activities. MET  8. Patient will verbalize understanding of surgical precautions to promote safety and proper healing. NOT ASSESSED  9. Patient will report the ability to return to desired recreational activities without significant restriction from the R shoulder such as but not limited to hunting and riding his motorcycle PROGRESSING  10. Updated 8/30/23: Patient will demonstrate the ability to lift a 4lb weight onto a shelf above shoulder height 5x consecutively without reports of increased pain to improve his ability to perform light lifting tasks at home NOT ASSSESSED

## 2023-11-29 ENCOUNTER — TREATMENT (OUTPATIENT)
Dept: PHYSICAL THERAPY | Facility: CLINIC | Age: 68
End: 2023-11-29
Payer: COMMERCIAL

## 2023-11-29 DIAGNOSIS — S46.911D STRAIN OF UNSPECIFIED MUSCLE, FASCIA AND TENDON AT SHOULDER AND UPPER ARM LEVEL, RIGHT ARM, SUBSEQUENT ENCOUNTER: ICD-10-CM

## 2023-11-29 DIAGNOSIS — S46.011D TRAUMATIC COMPLETE TEAR OF RIGHT ROTATOR CUFF, SUBSEQUENT ENCOUNTER: ICD-10-CM

## 2023-11-29 DIAGNOSIS — S46.911D: Primary | ICD-10-CM

## 2023-11-29 PROCEDURE — 97110 THERAPEUTIC EXERCISES: CPT | Mod: GP,CQ

## 2023-11-29 ASSESSMENT — PAIN SCALES - GENERAL: PAINLEVEL_OUTOF10: 2

## 2023-11-29 ASSESSMENT — PAIN - FUNCTIONAL ASSESSMENT: PAIN_FUNCTIONAL_ASSESSMENT: 0-10

## 2023-11-29 NOTE — PROGRESS NOTES
Physical Therapy    Physical Therapy Treatment    Patient Name: Rodrigo Dill  MRN: [id number]  Today's Date: 11/29/2023    Time Calculation  Start Time: 1103  Stop Time: 1151  Time Calculation (min): 48 min    Visit number: 28  POC: 30   Approved number of visits: 36  [facility]  100% UCR  5/31/23-12/9/23    Assessment:  Pt had some right shoulder soreness with exercise today but still able to complete all exercises without significant increases in pain. He reported minimal fatigue/soreness in right shoulder at end of session which decreased after ice. Pt would benefit from PT to continue to address impairments in order to improve strength, flexibility, posture, and body mechanics and to decrease symptoms and increase overall function.   PT Assessment  PT Assessment Results: Decreased strength, Decreased endurance, Pain      Plan:  OP PT Plan  PT Plan: Skilled PT  Continue to progress strength and stability of the right upper extremity  Current Problem  1. Rupture of tendon of upper extremity, right, subsequent encounter        2. Traumatic complete tear of right rotator cuff, subsequent encounter        3. Strain of unspecified muscle, fascia and tendon at shoulder and upper arm level, right arm, subsequent encounter                    Subjective   Pt states that right shoulder is a little sore today. He states that he had to brush snow off of the cars yesterday.     Precautions  Precautions  STEADI Fall Risk Score (The score of 4 or more indicates an increased risk of falling): 2  Pain  Pain Assessment: 0-10  Pain Score: 2  Pain Location: Shoulder  Pain Orientation: Right    Objective   Verbal cues to avoid right upper trap/levator scap substitution during exercises      Treatments: 11:03-11:41    Therapeutic Exercise  Therapeutic Exercise Activity 1: UBE L3.5 2' fwd 2' bwd ALT >/= 45  Therapeutic Exercise Activity 2: Supine shoulder flexion 1# 2x10 NT  Therapeutic Exercise Activity 3: Ball on wall up/down, R/L,  "CW/CCW small med ball x10 ea  Therapeutic Exercise Activity 4: Standing shoulder flexion/scaption/abd 0# x15 ea  Therapeutic Exercise Activity 5: Serratus punch 3# x10  Therapeutic Exercise Activity 6: SL shoulder ER 1# x15  Therapeutic Exercise Activity 7: SL shoulder abd 1# x15  Therapeutic Exercise Activity 8: SL shoulder flexion 2# x10 NT  Therapeutic Exercise Activity 9: Standing Y with lift off 15x3\"  Therapeutic Exercise Activity 10: High row BTT 2x10 NT  Therapeutic Exercise Activity 11: Standing shoulder flexion GTT 2x10 NT  Therapeutic Exercise Activity 12: Wall alphabet one arm x15  Therapeutic Exercise Activity 13: Wall ladder flexion/abduction x10 ea  Therapeutic Exercise Activity 14: Pulleys scpation x2'  Therapeutic Exercise Activity 15: SB rotations CW/CCW x15 ea  Therapeutic Exercise Activity 16: SB rhythmic stabilization 3x30\" NT  Therapeutic Exercise Activity 17: D1 extension GTT 2x10    11:41-11:51  CP to right shoulder following exercise x10'     Goals:   By discharge  1. Patient will demonstrate knowledge and compliance with HEP ONGOING  2. Patient will report decreased pain intensity in R shoulder of 0/10 at rest and </= 2/10 with any activity within the past 1-2 weeks. NOT ASSESSED  3. Patient will report sleeping through the night without interruption by pain at least 5/7 nights during the past week to promote increased daily function. NOT ASSESSED  4. Patient will demonstrate anterior, lateral, and posterior deltoid strength of at least 4+/5 to promote increased function with reaching, bathing, and dressing PROGRESSING  5. Patient will demonstrate R shoulder elevation against gravity of at least 120 degrees for improved ability to perform overhead tasks NOT ASSESSED  6. Patient will be able to maintain proper seated posture for at least 10 minutes without external cues from therapist to decrease the likelihood of future shoulder impairment/ injury MET  7. Patient will be able to demonstrate " the ability to reach behind his head in order to perform dressing and bathing activities. MET  8. Patient will verbalize understanding of surgical precautions to promote safety and proper healing. NOT ASSESSED  9. Patient will report the ability to return to desired recreational activities without significant restriction from the R shoulder such as but not limited to hunting and riding his motorcycle PROGRESSING  10. Updated 8/30/23: Patient will demonstrate the ability to lift a 4lb weight onto a shelf above shoulder height 5x consecutively without reports of increased pain to improve his ability to perform light lifting tasks at home NOT ASSSESSED

## 2023-12-04 ENCOUNTER — TREATMENT (OUTPATIENT)
Dept: PHYSICAL THERAPY | Facility: CLINIC | Age: 68
End: 2023-12-04
Payer: COMMERCIAL

## 2023-12-04 DIAGNOSIS — S46.011D TRAUMATIC COMPLETE TEAR OF RIGHT ROTATOR CUFF, SUBSEQUENT ENCOUNTER: ICD-10-CM

## 2023-12-04 DIAGNOSIS — S46.911D: Primary | ICD-10-CM

## 2023-12-04 DIAGNOSIS — S46.911D STRAIN OF UNSPECIFIED MUSCLE, FASCIA AND TENDON AT SHOULDER AND UPPER ARM LEVEL, RIGHT ARM, SUBSEQUENT ENCOUNTER: ICD-10-CM

## 2023-12-04 PROCEDURE — 97110 THERAPEUTIC EXERCISES: CPT | Mod: GP,CQ

## 2023-12-04 ASSESSMENT — PAIN SCALES - GENERAL: PAINLEVEL_OUTOF10: 0 - NO PAIN

## 2023-12-04 ASSESSMENT — PAIN - FUNCTIONAL ASSESSMENT: PAIN_FUNCTIONAL_ASSESSMENT: 0-10

## 2023-12-04 NOTE — PROGRESS NOTES
Physical Therapy    Physical Therapy Treatment    Patient Name: Rodrigo Dill  MRN: [id number]  Today's Date: 12/4/2023    Time Calculation  Start Time: 1015  Stop Time: 1110  Time Calculation (min): 55 min    Visit number: 29  POC: 30   Approved number of visits: 36  [facility]  100% UCR  5/31/23-12/9/23    Assessment:  Pt had some right shoulder soreness with exercise today but still able to complete all exercises without significant increases in pain. He reported minimal fatigue/soreness in right shoulder at end of session which decreased after ice. Pt would benefit from PT to continue to address impairments in order to improve strength, flexibility, posture, and body mechanics and to decrease symptoms and increase overall function.   PT Assessment  PT Assessment Results: Decreased strength, Decreased endurance, Pain      Plan:  OP PT Plan  PT Plan: Skilled PT  Continue to progress strength and stability of the right upper extremity    Current Problem  1. Rupture of tendon of upper extremity, right, subsequent encounter        2. Traumatic complete tear of right rotator cuff, subsequent encounter        3. Strain of unspecified muscle, fascia and tendon at shoulder and upper arm level, right arm, subsequent encounter                      Subjective   Pt denies right shoulder pain but states that he has some muscle soreness under his arm with movement due to overdoing it working on his carport over the weekend.     Precautions  Precautions  STEADI Fall Risk Score (The score of 4 or more indicates an increased risk of falling): 2  Pain  Pain Assessment: 0-10  Pain Score: 0 - No pain  Pain Location: Shoulder  Pain Orientation: Right    Objective   Verbal and tactile cues to avoid right upper trap/levator scap substitution during exercises      Treatments:   UBE  3.5 2'fwd/2'back alt >/=45  Supine shoulder flexion 1# 2x10 NT  Ball on wall up/down, R/L, CW/CCW small med ball x15 ea  Standing shoulder  flexion/scaption/abduction 0# x15 ea  Serratus punch 3# x10  SL shoulder ER 1# x15  SL shoulder abd 1# x15  SL shoulder flexion 2# x10 NT  Standing Y with lift off x 15  High row BTT 2x10 NT  Standing shoulder flexion GTT 2x10 NT  Wall alphabet one arm x1  Wall ladder flexion/abduction x10 ea  Pulleys scaption x 2'  SB rotations CW/CCW x10  SB flexion overhead x10  SB rhythmic stabilization 3x30 sec NT  D1 extension GTT x15  Peg board overhead 1 min 15 sec    Ball bounce overhead on wall with small med ball 40 sec  Ball drops small med ball x10 flex/scap      CP to right shoulder following exercise x10'     Goals:   By discharge  1. Patient will demonstrate knowledge and compliance with HEP ONGOING  2. Patient will report decreased pain intensity in R shoulder of 0/10 at rest and </= 2/10 with any activity within the past 1-2 weeks. NOT ASSESSED  3. Patient will report sleeping through the night without interruption by pain at least 5/7 nights during the past week to promote increased daily function. NOT ASSESSED  4. Patient will demonstrate anterior, lateral, and posterior deltoid strength of at least 4+/5 to promote increased function with reaching, bathing, and dressing PROGRESSING  5. Patient will demonstrate R shoulder elevation against gravity of at least 120 degrees for improved ability to perform overhead tasks NOT ASSESSED  6. Patient will be able to maintain proper seated posture for at least 10 minutes without external cues from therapist to decrease the likelihood of future shoulder impairment/ injury MET  7. Patient will be able to demonstrate the ability to reach behind his head in order to perform dressing and bathing activities. MET  8. Patient will verbalize understanding of surgical precautions to promote safety and proper healing. NOT ASSESSED  9. Patient will report the ability to return to desired recreational activities without significant restriction from the R shoulder such as but not limited  to hunting and riding his motorcycle PROGRESSING  10. Updated 8/30/23: Patient will demonstrate the ability to lift a 4lb weight onto a shelf above shoulder height 5x consecutively without reports of increased pain to improve his ability to perform light lifting tasks at home NOT ASSSESSED

## 2023-12-06 ENCOUNTER — TREATMENT (OUTPATIENT)
Dept: PHYSICAL THERAPY | Facility: CLINIC | Age: 68
End: 2023-12-06
Payer: COMMERCIAL

## 2023-12-06 DIAGNOSIS — S46.911D: Primary | ICD-10-CM

## 2023-12-06 DIAGNOSIS — S46.911D STRAIN OF UNSPECIFIED MUSCLE, FASCIA AND TENDON AT SHOULDER AND UPPER ARM LEVEL, RIGHT ARM, SUBSEQUENT ENCOUNTER: ICD-10-CM

## 2023-12-06 DIAGNOSIS — S46.011D TRAUMATIC COMPLETE TEAR OF RIGHT ROTATOR CUFF, SUBSEQUENT ENCOUNTER: ICD-10-CM

## 2023-12-06 PROCEDURE — 97110 THERAPEUTIC EXERCISES: CPT | Mod: GP | Performed by: PHYSICAL THERAPIST

## 2023-12-06 ASSESSMENT — ENCOUNTER SYMPTOMS
LOSS OF SENSATION IN FEET: 0
OCCASIONAL FEELINGS OF UNSTEADINESS: 0
DEPRESSION: 0

## 2023-12-06 ASSESSMENT — PAIN - FUNCTIONAL ASSESSMENT: PAIN_FUNCTIONAL_ASSESSMENT: 0-10

## 2023-12-06 ASSESSMENT — PAIN SCALES - GENERAL: PAINLEVEL_OUTOF10: 0 - NO PAIN

## 2023-12-06 NOTE — PROGRESS NOTES
Physical Therapy    Physical Therapy Treatment    Patient Name: Rodrigo Dill  MRN: [id number]  Today's Date: 12/6/2023    Time Calculation  Start Time: 1016  Stop Time: 1100  Time Calculation (min): 44 min    Visit number: 30  POC: 30   Approved number of visits: 36  [facility]  100% UCR  5/31/23-12/9/23    Assessment:  Patient has completed 30 therapy visits up to this point, and have met 8/10 established therapy goals. The patient has progressed well, and has shown improvements in pain intensity, strength, flexibility, and muscular endurance. At this time, the patient remains below functional baseline with mild weakness of the right shoulder, decreased muscular endurance in the right shoulder, and intermittent pain in the right shoulder. PT anticipates additional progression in these areas with continuation of HEP. Patient is discharged from formal physical therapy at this time, with instructions to continue with HEP, and follow up with physician should their status change.  PT Assessment  PT Assessment Results: Decreased strength, Decreased endurance, Pain  Rehab Prognosis: Excellent  Evaluation/Treatment Tolerance: Patient tolerated treatment well  Assessment Comment: Patient discharged to HEP      Plan:  OP PT Plan  PT Plan: No Additional PT interventions required at this time  Plan of Care Agreement: Patient  Continue to progress strength and stability of the right upper extremity    Current Problem  1. Rupture of tendon of upper extremity, right, subsequent encounter        2. Traumatic complete tear of right rotator cuff, subsequent encounter        3. Strain of unspecified muscle, fascia and tendon at shoulder and upper arm level, right arm, subsequent encounter                        Subjective   Patient reports that he has been feeling good over the past few weeks, and he has been doing more and more with the upper extremity. The shoulder continues to get sore when he uses it too much, but it is slowly  getting better. Pain is a 0/10 at rest, 3/10 at worst. Shoulder is no longer waking him up at night.     Precautions  Precautions  STEADI Fall Risk Score (The score of 4 or more indicates an increased risk of falling): 2  Pain  Pain Assessment: 0-10  Pain Score: 0 - No pain  Pain Location: Shoulder    Objective   Shoulder AROM (*indicates pain)  Flexion R 160  Abd R 141  IR R L5  ER R C3    Shoulder PROM (*indicates pain):   Flexion R 164  Abd R 148  IR R 56  ER R 74    Elevation against gravity: 141    Strength:  R shoulder  flex 4+/5  Abd 4+/5  IR 4+/5  ER 4+/5  Posture: able to adopt normal seated posture without cueing  Lifting: Pt able to lift a 4lb weight onto a shelf above shoulder height 5x consecutively and pain free  Recreation: unable to assess recreation goal d/t patient electing to hold off on these activities for additional time    QuickDash: 19; 18.18%    Treatments:   7573-1124  Obj measures and goals review  UBE  3 2'fwd/2'back alt >/=45  Supine shoulder flexion 1# 2x10 NT  Ball on wall up/down, R/L, CW/CCW small med ball x15 ea NT  Standing shoulder flexion/scaption/abduction 0# x15 ea NT  Serratus lifts YTB x5  High row BTT 2x10 NT  Standing shoulder flexion GTT 2x10 NT  Wall alphabet one arm x1 NT  Wall ladder flexion/abduction x10 ea  Pulleys scaption x 2' NT  SB rotations CW/CCW x10  SB flexion overhead x10 NT  SB rhythmic stabilization 3x30 sec NT  D1 extension GTT 2x10  D2 flexion RTT 2x10  Peg board overhead 1 min 15 sec NT   Ball bounce overhead on wall with small med ball x20  Ball drops small med ball x20 flex/abd  (41')    Goals:   By discharge  1. Patient will demonstrate knowledge and compliance with HEP MET  2. Patient will report decreased pain intensity in R shoulder of 0/10 at rest and </= 2/10 with any activity within the past 1-2 weeks. PARTIALLY MET  3. Patient will report sleeping through the night without interruption by pain at least 5/7 nights during the past week to promote  increased daily function. MET  4. Patient will demonstrate anterior, lateral, and posterior deltoid strength of at least 4+/5 to promote increased function with reaching, bathing, and dressing MET  5. Patient will demonstrate R shoulder elevation against gravity of at least 120 degrees for improved ability to perform overhead tasks MET  6. Patient will be able to maintain proper seated posture for at least 10 minutes without external cues from therapist to decrease the likelihood of future shoulder impairment/ injury MET  7. Patient will be able to demonstrate the ability to reach behind his head in order to perform dressing and bathing activities. MET  8. Patient will verbalize understanding of surgical precautions to promote safety and proper healing. MET  9. Patient will report the ability to return to desired recreational activities without significant restriction from the R shoulder such as but not limited to hunting and riding his motorcycle NOT ABLE TO ASSESS  10. Updated 8/30/23: Patient will demonstrate the ability to lift a 4lb weight onto a shelf above shoulder height 5x consecutively without reports of increased pain to improve his ability to perform light lifting tasks at home MET

## 2023-12-11 ENCOUNTER — APPOINTMENT (OUTPATIENT)
Dept: PHYSICAL THERAPY | Facility: CLINIC | Age: 68
End: 2023-12-11
Payer: COMMERCIAL

## 2023-12-13 ENCOUNTER — APPOINTMENT (OUTPATIENT)
Dept: PHYSICAL THERAPY | Facility: CLINIC | Age: 68
End: 2023-12-13
Payer: COMMERCIAL

## 2023-12-18 ENCOUNTER — APPOINTMENT (OUTPATIENT)
Dept: PHYSICAL THERAPY | Facility: CLINIC | Age: 68
End: 2023-12-18
Payer: COMMERCIAL

## 2023-12-20 ENCOUNTER — APPOINTMENT (OUTPATIENT)
Dept: PHYSICAL THERAPY | Facility: CLINIC | Age: 68
End: 2023-12-20
Payer: COMMERCIAL

## 2024-01-22 ENCOUNTER — APPOINTMENT (OUTPATIENT)
Dept: PRIMARY CARE | Facility: CLINIC | Age: 69
End: 2024-01-22
Payer: MEDICARE

## 2024-02-07 ENCOUNTER — APPOINTMENT (OUTPATIENT)
Dept: PRIMARY CARE | Facility: CLINIC | Age: 69
End: 2024-02-07
Payer: MEDICARE

## 2024-02-08 ENCOUNTER — APPOINTMENT (OUTPATIENT)
Dept: PRIMARY CARE | Facility: CLINIC | Age: 69
End: 2024-02-08
Payer: MEDICARE

## 2024-02-15 ENCOUNTER — TELEPHONE (OUTPATIENT)
Dept: PRIMARY CARE | Facility: CLINIC | Age: 69
End: 2024-02-15
Payer: MEDICARE

## 2024-02-15 DIAGNOSIS — F41.9 ANXIETY: ICD-10-CM

## 2024-02-15 DIAGNOSIS — I10 BENIGN ESSENTIAL HTN: ICD-10-CM

## 2024-02-15 NOTE — TELEPHONE ENCOUNTER
Pts wife states he needs a refill of alprazolam and lisinopril, his next ov is 3/20/2024. The pharmacy is [facility] [id number]. Thank you

## 2024-02-16 RX ORDER — ALPRAZOLAM 0.5 MG/1
0.5 TABLET ORAL NIGHTLY PRN
Qty: 30 TABLET | Refills: 0 | Status: SHIPPED | OUTPATIENT
Start: 2024-02-16 | End: 2024-03-05 | Stop reason: SDUPTHER

## 2024-02-16 RX ORDER — LISINOPRIL 5 MG/1
5 TABLET ORAL DAILY
Qty: 90 TABLET | Refills: 1 | Status: SHIPPED | OUTPATIENT
Start: 2024-02-16

## 2024-03-05 ENCOUNTER — OFFICE VISIT (OUTPATIENT)
Dept: PRIMARY CARE | Facility: CLINIC | Age: 69
End: 2024-03-05
Payer: MEDICARE

## 2024-03-05 ENCOUNTER — LAB (OUTPATIENT)
Dept: LAB | Facility: LAB | Age: 69
End: 2024-03-05
Payer: MEDICARE

## 2024-03-05 VITALS
SYSTOLIC BLOOD PRESSURE: 104 MMHG | WEIGHT: 175 LBS | HEART RATE: 67 BPM | HEIGHT: 68 IN | OXYGEN SATURATION: 94 % | TEMPERATURE: 98.1 F | BODY MASS INDEX: 26.52 KG/M2 | DIASTOLIC BLOOD PRESSURE: 66 MMHG

## 2024-03-05 DIAGNOSIS — E11.9 DIET-CONTROLLED DIABETES MELLITUS (MULTI): ICD-10-CM

## 2024-03-05 DIAGNOSIS — K21.9 GASTROESOPHAGEAL REFLUX DISEASE WITHOUT ESOPHAGITIS: ICD-10-CM

## 2024-03-05 DIAGNOSIS — Z79.899 HIGH RISK MEDICATION USE: Primary | ICD-10-CM

## 2024-03-05 DIAGNOSIS — F41.9 ANXIETY: ICD-10-CM

## 2024-03-05 LAB
AMPHETAMINES UR QL SCN: NORMAL
ANION GAP SERPL CALC-SCNC: 16 MMOL/L (ref 10–20)
BARBITURATES UR QL SCN: NORMAL
BUN SERPL-MCNC: 23 MG/DL (ref 6–23)
BZE UR QL SCN: NORMAL
CALCIUM SERPL-MCNC: 9.9 MG/DL (ref 8.6–10.6)
CANNABINOIDS UR QL SCN: NORMAL
CHLORIDE SERPL-SCNC: 104 MMOL/L (ref 98–107)
CO2 SERPL-SCNC: 25 MMOL/L (ref 21–32)
CREAT SERPL-MCNC: 1.09 MG/DL (ref 0.5–1.3)
CREAT UR-MCNC: 109 MG/DL (ref 20–370)
EGFRCR SERPLBLD CKD-EPI 2021: 74 ML/MIN/1.73M*2
EST. AVERAGE GLUCOSE BLD GHB EST-MCNC: 171 MG/DL
GLUCOSE SERPL-MCNC: 159 MG/DL (ref 74–99)
HBA1C MFR BLD: 7.6 %
PCP UR QL SCN: NORMAL
POTASSIUM SERPL-SCNC: 4.6 MMOL/L (ref 3.5–5.3)
SODIUM SERPL-SCNC: 140 MMOL/L (ref 136–145)

## 2024-03-05 PROCEDURE — 1159F MED LIST DOCD IN RCRD: CPT | Performed by: INTERNAL MEDICINE

## 2024-03-05 PROCEDURE — 82570 ASSAY OF URINE CREATININE: CPT

## 2024-03-05 PROCEDURE — 80361 OPIATES 1 OR MORE: CPT

## 2024-03-05 PROCEDURE — 80368 SEDATIVE HYPNOTICS: CPT

## 2024-03-05 PROCEDURE — 80307 DRUG TEST PRSMV CHEM ANLYZR: CPT

## 2024-03-05 PROCEDURE — 80373 DRUG SCREENING TRAMADOL: CPT

## 2024-03-05 PROCEDURE — 3078F DIAST BP <80 MM HG: CPT | Performed by: INTERNAL MEDICINE

## 2024-03-05 PROCEDURE — 99214 OFFICE O/P EST MOD 30 MIN: CPT | Performed by: INTERNAL MEDICINE

## 2024-03-05 PROCEDURE — 36415 COLL VENOUS BLD VENIPUNCTURE: CPT

## 2024-03-05 PROCEDURE — 1036F TOBACCO NON-USER: CPT | Performed by: INTERNAL MEDICINE

## 2024-03-05 PROCEDURE — 80365 DRUG SCREENING OXYCODONE: CPT

## 2024-03-05 PROCEDURE — 80354 DRUG SCREENING FENTANYL: CPT

## 2024-03-05 PROCEDURE — 1125F AMNT PAIN NOTED PAIN PRSNT: CPT | Performed by: INTERNAL MEDICINE

## 2024-03-05 PROCEDURE — 1157F ADVNC CARE PLAN IN RCRD: CPT | Performed by: INTERNAL MEDICINE

## 2024-03-05 PROCEDURE — 3074F SYST BP LT 130 MM HG: CPT | Performed by: INTERNAL MEDICINE

## 2024-03-05 PROCEDURE — 83036 HEMOGLOBIN GLYCOSYLATED A1C: CPT

## 2024-03-05 PROCEDURE — 80048 BASIC METABOLIC PNL TOTAL CA: CPT

## 2024-03-05 PROCEDURE — 4010F ACE/ARB THERAPY RXD/TAKEN: CPT | Performed by: INTERNAL MEDICINE

## 2024-03-05 PROCEDURE — 80358 DRUG SCREENING METHADONE: CPT

## 2024-03-05 PROCEDURE — 80346 BENZODIAZEPINES1-12: CPT

## 2024-03-05 RX ORDER — ALPRAZOLAM 0.5 MG/1
0.5 TABLET ORAL NIGHTLY PRN
Qty: 30 TABLET | Refills: 2 | Status: SHIPPED | OUTPATIENT
Start: 2024-03-05 | End: 2024-06-03

## 2024-03-05 RX ORDER — BUSPIRONE HYDROCHLORIDE 5 MG/1
5 TABLET ORAL 2 TIMES DAILY
Qty: 180 TABLET | Refills: 3 | Status: SHIPPED | OUTPATIENT
Start: 2024-03-05 | End: 2025-03-05

## 2024-03-05 RX ORDER — LANSOPRAZOLE 30 MG/1
30 CAPSULE, DELAYED RELEASE ORAL
Qty: 30 CAPSULE | Refills: 11 | Status: SHIPPED | OUTPATIENT
Start: 2024-03-05 | End: 2025-03-05

## 2024-03-05 NOTE — PROGRESS NOTES
"PCP: Neema Antony MD   I have reviewed existing histories, notes, past medical history, surgical history, social history, family history, med list, allergy list, and immunization, and updated.    HPI:   3 months Follow up.  He feels well,  Lot less stress since he retired last year.  Is very active, doing yard work etc  right should is feeling good after cortisone injection.      Lab Results   Component Value Date    WBC CANCELED 05/25/2023    HGB CANCELED 05/25/2023    HCT CANCELED 05/25/2023    PLT CANCELED 05/25/2023    CHOL 192 08/09/2023    TRIG 152 (H) 08/09/2023    HDL 45.0 08/09/2023    LDLDIRECT 107 12/22/2021    ALT 38 08/09/2023    AST 35 08/09/2023     11/14/2023    K 5.0 11/14/2023     11/14/2023    CREATININE 1.01 11/14/2023    BUN 23 11/14/2023    CO2 26 11/14/2023    PSA 0.43 01/23/2021    HGBA1C 7.5 (H) 11/14/2023     Physical exam:  /66   Pulse 67   Temp 36.7 °C (98.1 °F)   Ht 1.727 m (5' 8\")   Wt 79.4 kg (175 lb)   SpO2 94%   BMI 26.61 kg/m²    Neck exam showed no mass, lymphadenopathy, or thyroid enlargement, and no carotid bruit.  Heart exam showed normal heart sounds, no murmur, clicks, gallops or rubs. Regular rate and rhythm. Chest is clear; no wheezes or rales.   No leg edema.  Feet no problems    Assessments/orders:   1. High risk medication use  Opiate/Opioid/Benzo Prescription Compliance      2. Anxiety  busPIRone (Buspar) 5 mg tablet, ALPRAZolam (Xanax) 0.5 mg tablet, Benzodiazepine Confirmation, Urine      3. Gastroesophageal reflux disease without esophagitis  lansoprazole (Prevacid) 30 mg DR capsule      4. Diet-controlled diabetes mellitus (CMS/[facility])             OARRS:  Neema Antony MD on 3/5/2024 12:08 PM  I have personally reviewed the OARRS report for Rodrigo Dill. I have considered the risks of abuse, dependence, addiction and diversion    Is the patient prescribed a combination of a benzodiazepine and opioid?  no    Last Urine Drug Screen / ordered " today: Yes  No results found for this or any previous visit (from the past 8760 hour(s)).      Controlled Substance Agreement:  Date of the Last Agreement: today  Reviewed Controlled Substance Agreement including but not limited to the benefits, risks, and alternatives to treatment with a Controlled Substance medication(s).    Benzodiazepines:  What is the patient's goal of therapy? anxiety  Is this being achieved with current treatment? yes      Activities of Daily Living:   Is your overall impression that this patient is benefiting (symptom reduction outweighs side effects) from benzodiazepine therapy? Yes     1. Physical Functioning: Better  2. Family Relationship: Better  3. Social Relationship: Better  4. Mood: Better  5. Sleep Patterns: Better  6. Overall Function: Better

## 2024-03-09 LAB
1OH-MIDAZOLAM UR CFM-MCNC: <25 NG/ML
6MAM UR CFM-MCNC: <25 NG/ML
7AMINOCLONAZEPAM UR CFM-MCNC: <25 NG/ML
A-OH ALPRAZ UR CFM-MCNC: 53 NG/ML
ALPRAZ UR CFM-MCNC: 49 NG/ML
CHLORDIAZEP UR CFM-MCNC: <25 NG/ML
CLONAZEPAM UR CFM-MCNC: <25 NG/ML
CODEINE UR CFM-MCNC: <50 NG/ML
DIAZEPAM UR CFM-MCNC: <25 NG/ML
EDDP UR CFM-MCNC: <25 NG/ML
FENTANYL UR CFM-MCNC: <2.5 NG/ML
HYDROCODONE CTO UR CFM-MCNC: <25 NG/ML
HYDROMORPHONE UR CFM-MCNC: <25 NG/ML
LORAZEPAM UR CFM-MCNC: <25 NG/ML
METHADONE UR CFM-MCNC: <25 NG/ML
MIDAZOLAM UR CFM-MCNC: <25 NG/ML
MORPHINE UR CFM-MCNC: <50 NG/ML
NORDIAZEPAM UR CFM-MCNC: <25 NG/ML
NORFENTANYL UR CFM-MCNC: <2.5 NG/ML
NORHYDROCODONE UR CFM-MCNC: <25 NG/ML
NOROXYCODONE UR CFM-MCNC: <25 NG/ML
NORTRAMADOL UR-MCNC: <50 NG/ML
OXAZEPAM UR CFM-MCNC: <25 NG/ML
OXYCODONE UR CFM-MCNC: <25 NG/ML
OXYMORPHONE UR CFM-MCNC: <25 NG/ML
TEMAZEPAM UR CFM-MCNC: <25 NG/ML
TRAMADOL UR CFM-MCNC: <50 NG/ML
ZOLPIDEM UR CFM-MCNC: <25 NG/ML
ZOLPIDEM UR-MCNC: <25 NG/ML

## 2024-03-20 ENCOUNTER — APPOINTMENT (OUTPATIENT)
Dept: PRIMARY CARE | Facility: CLINIC | Age: 69
End: 2024-03-20
Payer: MEDICARE

## 2024-03-27 ENCOUNTER — PATIENT MESSAGE (OUTPATIENT)
Dept: PRIMARY CARE | Facility: CLINIC | Age: 69
End: 2024-03-27
Payer: MEDICARE

## 2024-03-27 DIAGNOSIS — E11.69 TYPE 2 DIABETES MELLITUS WITH OTHER SPECIFIED COMPLICATION, WITHOUT LONG-TERM CURRENT USE OF INSULIN (MULTI): Primary | ICD-10-CM

## 2024-03-27 RX ORDER — BLOOD-GLUCOSE METER
KIT MISCELLANEOUS
Qty: 100 EACH | Refills: 3 | Status: SHIPPED | OUTPATIENT
Start: 2024-03-27

## 2024-05-06 NOTE — OP NOTE
PREOPERATIVE DIAGNOSIS:    POSTOPERATIVE DIAGNOSIS:    OPERATION/PROCEDURE:  1. Right reverse total shoulder arthroplasty.  2. Right open biceps tenodesis.    SURGEON:  Oswaldo Ackerman MD.    ASSISTANT(S):  Carmelo.    ANESTHESIA:  General.    DIAGNOSES:  1. Massive rotator cuff tear, right shoulder.  2. Biceps tendinopathy.    COMPLICATIONS:  None.    EQUIPMENT USED:  Arthrex reverse total shoulder.    DESCRIPTION OF OPERATION:  The patient was placed under general anesthesia and positioned using  the shoulder positioner in the beach chair configuration.  The right  arm and shoulder sterilely prepped and draped in usual fashion.  A  standard deltopectoral approach was made to the shoulder.  Cephalic  vein was preserved.  The subscapularis tenotomy was performed.  The  stump of the tendon was left for closure.  Notably, the entire  remaining of the rotator cuff was detached and retracted.  The  bicipital groove was entered using Bovie cautery.  The proximal  portion of the biceps was excised, and then using FiberWire sutures,  biceps tenodesis was performed near the pec tendon.  The humeral head  was dislocated.  The humeral head cut was made using neck cutting  guide in the proper version.  Head protector was then placed.  Next,  deep glenoid retractors were placed.  Labrum was excised.  Axillary  nerve was protected.  The glenoid was then prepared using the Arthrex  Universe system, a 24 mm +4 lateralized base plate was utilized, it  was fixed using the central screw followed by 2 locking screws  inferiorly and superiorly.  A 39 x 24 glenosphere was then  cold-welded and secured with a central locking screw. Next, attention  was paid to the humeral side.  The canal was then broached.  Ultimately, a size 10 universe stem had good torsional stability and  a medium 39 constrained 9 mm insert was utilized.  This was quite a  stable construct that allowed excellent mobility.  The wound  was  irrigated and closed in layers, it was dried prior to closure.  The  skin was closed using subcuticular suture with Dermabond dressing.  The patient was taken to Recovery, having tolerated the procedure  well.       Oswaldo Ackerman MD    DD:  05/24/2023 12:53:27 EST  DT:  05/24/2023 14:57:23 EST  DICTATION NUMBER:  [id number]  INTERNAL JOB NUMBER:  [id number]    CC:  Oswaldo Ackerman MD        Electronic Signatures:  Oswaldo Ackerman) (Signed on 07-Jun-2023 07:38)   Authored  Unsigned, Draft (SYS GENERATED) (Entered on 24-May-2023 14:57)   Entered    Last Updated: 07-Jun-2023 07:38 by Oswaldo Ackerman)

## 2024-05-07 ENCOUNTER — OFFICE VISIT (OUTPATIENT)
Dept: ORTHOPEDIC SURGERY | Facility: CLINIC | Age: 69
End: 2024-05-07
Payer: COMMERCIAL

## 2024-05-07 ENCOUNTER — HOSPITAL ENCOUNTER (OUTPATIENT)
Dept: RADIOLOGY | Facility: CLINIC | Age: 69
Discharge: HOME | End: 2024-05-07
Payer: COMMERCIAL

## 2024-05-07 VITALS — HEIGHT: 68 IN | BODY MASS INDEX: 26.22 KG/M2 | WEIGHT: 173 LBS

## 2024-05-07 DIAGNOSIS — G89.29 CHRONIC RIGHT SHOULDER PAIN: ICD-10-CM

## 2024-05-07 DIAGNOSIS — M25.511 CHRONIC RIGHT SHOULDER PAIN: ICD-10-CM

## 2024-05-07 PROCEDURE — 73030 X-RAY EXAM OF SHOULDER: CPT | Mod: RIGHT SIDE | Performed by: STUDENT IN AN ORGANIZED HEALTH CARE EDUCATION/TRAINING PROGRAM

## 2024-05-07 PROCEDURE — 73030 X-RAY EXAM OF SHOULDER: CPT | Mod: RT

## 2024-05-07 PROCEDURE — 99212 OFFICE O/P EST SF 10 MIN: CPT | Performed by: ORTHOPAEDIC SURGERY

## 2024-05-07 ASSESSMENT — PAIN SCALES - GENERAL: PAINLEVEL: 5

## 2024-05-07 NOTE — PROGRESS NOTES
Follow-up reverse shoulder replacement doing very well he is very active on examination he has excellent mobility and good strength assessment doing well reviewed expectations precautions follow-up annually

## 2024-05-08 ENCOUNTER — APPOINTMENT (OUTPATIENT)
Dept: PRIMARY CARE | Facility: CLINIC | Age: 69
End: 2024-05-08
Payer: MEDICARE

## 2024-06-13 ENCOUNTER — PATIENT MESSAGE (OUTPATIENT)
Dept: PRIMARY CARE | Facility: CLINIC | Age: 69
End: 2024-06-13
Payer: MEDICARE

## 2024-06-13 DIAGNOSIS — F41.9 ANXIETY: ICD-10-CM

## 2024-06-13 NOTE — TELEPHONE ENCOUNTER
From: Rodrigo Dill  To: Neema Antony  Sent: 6/13/2024 1:07 PM EDT  Subject: Medication    I need a new refill for my alprazolam sent to [facility]. My appointment is not till the 20th.    Thank you

## 2024-06-14 RX ORDER — ALPRAZOLAM 0.5 MG/1
0.5 TABLET ORAL NIGHTLY PRN
Qty: 30 TABLET | Refills: 2 | Status: SHIPPED | OUTPATIENT
Start: 2024-06-14 | End: 2024-09-12

## 2024-06-20 ENCOUNTER — APPOINTMENT (OUTPATIENT)
Dept: PRIMARY CARE | Facility: CLINIC | Age: 69
End: 2024-06-20
Payer: MEDICARE

## 2024-06-20 VITALS
OXYGEN SATURATION: 95 % | TEMPERATURE: 98.3 F | WEIGHT: 170 LBS | SYSTOLIC BLOOD PRESSURE: 107 MMHG | HEART RATE: 63 BPM | DIASTOLIC BLOOD PRESSURE: 65 MMHG | HEIGHT: 68 IN | BODY MASS INDEX: 25.76 KG/M2

## 2024-06-20 DIAGNOSIS — N52.9 ERECTILE DYSFUNCTION, UNSPECIFIED ERECTILE DYSFUNCTION TYPE: Primary | ICD-10-CM

## 2024-06-20 DIAGNOSIS — F41.9 ANXIETY: ICD-10-CM

## 2024-06-20 DIAGNOSIS — E11.9 DIET-CONTROLLED DIABETES MELLITUS (MULTI): ICD-10-CM

## 2024-06-20 DIAGNOSIS — I10 BENIGN ESSENTIAL HTN: ICD-10-CM

## 2024-06-20 PROBLEM — R73.03 PREDIABETES: Status: ACTIVE | Noted: 2024-06-20

## 2024-06-20 PROBLEM — M67.919 DISORDER OF ROTATOR CUFF: Status: ACTIVE | Noted: 2024-06-20

## 2024-06-20 PROBLEM — Z91.199 PATIENT'S NONCOMPLIANCE WITH OTHER MEDICAL TREATMENT AND REGIMEN DUE TO UNSPECIFIED REASON: Status: ACTIVE | Noted: 2020-02-03

## 2024-06-20 PROBLEM — F19.21 HISTORY OF SUBSTANCE DEPENDENCE (MULTI): Status: ACTIVE | Noted: 2024-06-20

## 2024-06-20 PROBLEM — M75.20 BICEPS TENDINITIS: Status: ACTIVE | Noted: 2024-06-20

## 2024-06-20 PROBLEM — E78.5 HYPERLIPIDEMIA: Status: ACTIVE | Noted: 2023-08-09

## 2024-06-20 PROCEDURE — 4010F ACE/ARB THERAPY RXD/TAKEN: CPT | Performed by: INTERNAL MEDICINE

## 2024-06-20 PROCEDURE — 1036F TOBACCO NON-USER: CPT | Performed by: INTERNAL MEDICINE

## 2024-06-20 PROCEDURE — 99214 OFFICE O/P EST MOD 30 MIN: CPT | Performed by: INTERNAL MEDICINE

## 2024-06-20 PROCEDURE — 3074F SYST BP LT 130 MM HG: CPT | Performed by: INTERNAL MEDICINE

## 2024-06-20 PROCEDURE — 3051F HG A1C>EQUAL 7.0%<8.0%: CPT | Performed by: INTERNAL MEDICINE

## 2024-06-20 PROCEDURE — 1159F MED LIST DOCD IN RCRD: CPT | Performed by: INTERNAL MEDICINE

## 2024-06-20 PROCEDURE — 3078F DIAST BP <80 MM HG: CPT | Performed by: INTERNAL MEDICINE

## 2024-06-20 PROCEDURE — 1157F ADVNC CARE PLAN IN RCRD: CPT | Performed by: INTERNAL MEDICINE

## 2024-06-20 PROCEDURE — 3060F POS MICROALBUMINURIA REV: CPT | Performed by: INTERNAL MEDICINE

## 2024-06-20 RX ORDER — TADALAFIL 10 MG/1
10 TABLET ORAL DAILY PRN
Qty: 10 TABLET | Refills: 1 | Status: SHIPPED | OUTPATIENT
Start: 2024-06-20 | End: 2025-06-20

## 2024-06-20 NOTE — PROGRESS NOTES
"PCP: Neema Antony MD   I have reviewed existing histories, notes, past medical history, surgical history, social history, family history, med list, allergy list, and immunization, and updated.    HPI:   Follow up anxiety, htn, dm. Diet controlled  Also Complains of  Erectile dysfunction.  Has libido, just sometime cannot erect.  Dm in good control.  He is able to lose some weight.  Healthy diet  Regular activities    Lab Results   Component Value Date    WBC CANCELED 05/25/2023    HGB CANCELED 05/25/2023    HCT CANCELED 05/25/2023    PLT CANCELED 05/25/2023    CHOL 192 08/09/2023    TRIG 152 (H) 08/09/2023    HDL 45.0 08/09/2023    LDLDIRECT 107 12/22/2021    ALT 38 08/09/2023    AST 35 08/09/2023     03/05/2024    K 4.6 03/05/2024     03/05/2024    CREATININE 1.09 03/05/2024    BUN 23 03/05/2024    CO2 25 03/05/2024    PSA 0.43 01/23/2021    HGBA1C 7.6 (H) 03/05/2024     Physical exam:  /65   Pulse 63   Temp 36.8 °C (98.3 °F)   Ht 1.727 m (5' 8\")   Wt 77.1 kg (170 lb)   SpO2 95%   BMI 25.85 kg/m²    Neck exam showed no mass, lymphadenopathy, or thyroid enlargement, and no carotid bruit.  Heart exam showed normal heart sounds, no murmur, clicks, gallops or rubs. Regular rate and rhythm. Chest is clear; no wheezes or rales.  No leg edema.  No focal neurologic deficit     Assessments/orders:   1. Erectile dysfunction, unspecified erectile dysfunction type  tadalafil (Cialis) 10 mg tablet      2. Diet-controlled diabetes mellitus (Multi)        3. Benign essential HTN        4. Anxiety        OARRS:  No data recorded  I have personally reviewed the OARRS report for Rodrigo Dill. I have considered the risks of abuse, dependence, addiction and diversion    Is the patient prescribed a combination of a benzodiazepine and opioid?  No    Last Urine Drug Screen / ordered today: Yes  Recent Results (from the past 8760 hour(s))   Confirmation Opiate/Opioid/Benzo Prescription Compliance    Collection " Time: 03/05/24 12:22 PM   Result Value Ref Range    Clonazepam <25 <25 ng/mL    7-Aminoclonazepam <25 <25 ng/mL    Alprazolam 49 (H) <25 ng/mL    Alpha-Hydroxyalprazolam 53 (H) <25 ng/mL    Midazolam <25 <25 ng/mL    Alpha-Hydroxymidazolam <25 <25 ng/mL    Chlordiazepoxide <25 <25 ng/mL    Diazepam <25 <25 ng/mL    Nordiazepam <25 <25 ng/mL    Temazepam <25 <25 ng/mL    Oxazepam <25 <25 ng/mL    Lorazepam <25 <25 ng/mL    Methadone <25 <25 ng/mL    EDDP <25 <25 ng/mL    6-Acetylmorphine <25 <25 ng/mL    Codeine <50 <50 ng/mL    Hydrocodone <25 <25 ng/mL    Hydromorphone <25 <25 ng/mL    Morphine  <50 <50 ng/mL    Norhydrocodone <25 <25 ng/mL    Noroxycodone <25 <25 ng/mL    Oxycodone <25 <25 ng/mL    Oxymorphone <25 <25 ng/mL    Fentanyl <2.5 <2.5 ng/mL    Norfentanyl <2.5 <2.5 ng/mL    Tramadol <50 <50 ng/mL    O-Desmethyltramadol <50 <50 ng/mL    Zolpidem <25 <25 ng/mL    Zolpidem Metabolite (ZCA) <25 <25 ng/mL   Screen Opiate/Opioid/Benzo Prescription Compliance    Collection Time: 03/05/24 12:22 PM   Result Value Ref Range    Creatinine, Urine Random 109.0 20.0 - 370.0 mg/dL    Amphetamine Screen, Urine Presumptive Negative Presumptive Negative    Barbiturate Screen, Urine Presumptive Negative Presumptive Negative    Cannabinoid Screen, Urine Presumptive Negative Presumptive Negative    Cocaine Metabolite Screen, Urine Presumptive Negative Presumptive Negative    PCP Screen, Urine Presumptive Negative Presumptive Negative     N/A        Controlled Substance Agreement:  Date of the Last Agreement: 6 months ago  Reviewed Controlled Substance Agreement including but not limited to the benefits, risks, and alternatives to treatment with a Controlled Substance medication(s).    Benzodiazepines:  What is the patient's goal of therapy? anxiety  Is this being achieved with current treatment? yes    VIVI-7:  No data recorded    Activities of Daily Living:   Is your overall impression that this patient is benefiting  (symptom reduction outweighs side effects) from benzodiazepine therapy? Yes     1. Physical Functioning: Better  2. Family Relationship: Better  3. Social Relationship: Better  4. Mood: Better  5. Sleep Patterns: Better  6. Overall Function: Better

## 2024-08-05 ENCOUNTER — PATIENT MESSAGE (OUTPATIENT)
Dept: PRIMARY CARE | Facility: CLINIC | Age: 69
End: 2024-08-05
Payer: MEDICARE

## 2024-08-05 DIAGNOSIS — I10 BENIGN ESSENTIAL HTN: ICD-10-CM

## 2024-08-06 DIAGNOSIS — I10 BENIGN ESSENTIAL HTN: ICD-10-CM

## 2024-08-06 RX ORDER — LISINOPRIL 5 MG/1
5 TABLET ORAL DAILY
Qty: 90 TABLET | Refills: 1 | Status: SHIPPED | OUTPATIENT
Start: 2024-08-06

## 2024-08-06 RX ORDER — LISINOPRIL 5 MG/1
5 TABLET ORAL DAILY
Qty: 90 TABLET | Refills: 1 | Status: CANCELLED | OUTPATIENT
Start: 2024-08-06

## 2024-08-08 ENCOUNTER — APPOINTMENT (OUTPATIENT)
Dept: PRIMARY CARE | Facility: CLINIC | Age: 69
End: 2024-08-08
Payer: MEDICARE

## 2024-09-16 DIAGNOSIS — F41.9 ANXIETY: ICD-10-CM

## 2024-09-16 RX ORDER — ALPRAZOLAM 0.5 MG/1
0.5 TABLET ORAL NIGHTLY PRN
Qty: 30 TABLET | Refills: 0 | Status: SHIPPED | OUTPATIENT
Start: 2024-09-16 | End: 2024-12-15

## 2024-09-17 ENCOUNTER — APPOINTMENT (OUTPATIENT)
Dept: PRIMARY CARE | Facility: CLINIC | Age: 69
End: 2024-09-17
Payer: MEDICARE

## 2024-09-25 ENCOUNTER — LAB (OUTPATIENT)
Dept: LAB | Facility: LAB | Age: 69
End: 2024-09-25
Payer: MEDICARE

## 2024-09-25 ENCOUNTER — APPOINTMENT (OUTPATIENT)
Dept: PRIMARY CARE | Facility: CLINIC | Age: 69
End: 2024-09-25
Payer: MEDICARE

## 2024-09-25 VITALS
WEIGHT: 169 LBS | HEIGHT: 68 IN | TEMPERATURE: 98.1 F | BODY MASS INDEX: 25.61 KG/M2 | OXYGEN SATURATION: 95 % | HEART RATE: 68 BPM | DIASTOLIC BLOOD PRESSURE: 61 MMHG | SYSTOLIC BLOOD PRESSURE: 106 MMHG

## 2024-09-25 DIAGNOSIS — F41.9 ANXIETY: Primary | ICD-10-CM

## 2024-09-25 DIAGNOSIS — Z13.6 SCREENING FOR AAA (ABDOMINAL AORTIC ANEURYSM): ICD-10-CM

## 2024-09-25 DIAGNOSIS — E11.69 TYPE 2 DIABETES MELLITUS WITH OTHER SPECIFIED COMPLICATION, WITHOUT LONG-TERM CURRENT USE OF INSULIN: ICD-10-CM

## 2024-09-25 DIAGNOSIS — E78.5 HYPERLIPIDEMIA, UNSPECIFIED HYPERLIPIDEMIA TYPE: ICD-10-CM

## 2024-09-25 LAB
ALBUMIN SERPL BCP-MCNC: 4.2 G/DL (ref 3.4–5)
ALP SERPL-CCNC: 41 U/L (ref 33–136)
ALT SERPL W P-5'-P-CCNC: 21 U/L (ref 10–52)
ANION GAP SERPL CALC-SCNC: 11 MMOL/L (ref 10–20)
AST SERPL W P-5'-P-CCNC: 17 U/L (ref 9–39)
BILIRUB SERPL-MCNC: 0.4 MG/DL (ref 0–1.2)
BUN SERPL-MCNC: 22 MG/DL (ref 6–23)
CALCIUM SERPL-MCNC: 9.6 MG/DL (ref 8.6–10.6)
CHLORIDE SERPL-SCNC: 105 MMOL/L (ref 98–107)
CHOLEST SERPL-MCNC: 172 MG/DL (ref 0–199)
CHOLESTEROL/HDL RATIO: 4.1
CO2 SERPL-SCNC: 27 MMOL/L (ref 21–32)
CREAT SERPL-MCNC: 1.09 MG/DL (ref 0.5–1.3)
EGFRCR SERPLBLD CKD-EPI 2021: 74 ML/MIN/1.73M*2
EST. AVERAGE GLUCOSE BLD GHB EST-MCNC: 169 MG/DL
GLUCOSE SERPL-MCNC: 103 MG/DL (ref 74–99)
HBA1C MFR BLD: 7.5 %
HDLC SERPL-MCNC: 42.2 MG/DL
LDLC SERPL CALC-MCNC: 79 MG/DL
NON HDL CHOLESTEROL: 130 MG/DL (ref 0–149)
POTASSIUM SERPL-SCNC: 4.3 MMOL/L (ref 3.5–5.3)
PROT SERPL-MCNC: 7 G/DL (ref 6.4–8.2)
SODIUM SERPL-SCNC: 139 MMOL/L (ref 136–145)
TRIGL SERPL-MCNC: 254 MG/DL (ref 0–149)
VLDL: 51 MG/DL (ref 0–40)

## 2024-09-25 PROCEDURE — 4010F ACE/ARB THERAPY RXD/TAKEN: CPT | Performed by: INTERNAL MEDICINE

## 2024-09-25 PROCEDURE — 80061 LIPID PANEL: CPT

## 2024-09-25 PROCEDURE — 1036F TOBACCO NON-USER: CPT | Performed by: INTERNAL MEDICINE

## 2024-09-25 PROCEDURE — 3008F BODY MASS INDEX DOCD: CPT | Performed by: INTERNAL MEDICINE

## 2024-09-25 PROCEDURE — 3051F HG A1C>EQUAL 7.0%<8.0%: CPT | Performed by: INTERNAL MEDICINE

## 2024-09-25 PROCEDURE — 1123F ACP DISCUSS/DSCN MKR DOCD: CPT | Performed by: INTERNAL MEDICINE

## 2024-09-25 PROCEDURE — G0008 ADMIN INFLUENZA VIRUS VAC: HCPCS | Performed by: INTERNAL MEDICINE

## 2024-09-25 PROCEDURE — 1159F MED LIST DOCD IN RCRD: CPT | Performed by: INTERNAL MEDICINE

## 2024-09-25 PROCEDURE — 99214 OFFICE O/P EST MOD 30 MIN: CPT | Performed by: INTERNAL MEDICINE

## 2024-09-25 PROCEDURE — 3060F POS MICROALBUMINURIA REV: CPT | Performed by: INTERNAL MEDICINE

## 2024-09-25 PROCEDURE — 3078F DIAST BP <80 MM HG: CPT | Performed by: INTERNAL MEDICINE

## 2024-09-25 PROCEDURE — 1157F ADVNC CARE PLAN IN RCRD: CPT | Performed by: INTERNAL MEDICINE

## 2024-09-25 PROCEDURE — 90662 IIV NO PRSV INCREASED AG IM: CPT | Performed by: INTERNAL MEDICINE

## 2024-09-25 PROCEDURE — 1158F ADVNC CARE PLAN TLK DOCD: CPT | Performed by: INTERNAL MEDICINE

## 2024-09-25 PROCEDURE — 3074F SYST BP LT 130 MM HG: CPT | Performed by: INTERNAL MEDICINE

## 2024-09-25 PROCEDURE — G2211 COMPLEX E/M VISIT ADD ON: HCPCS | Performed by: INTERNAL MEDICINE

## 2024-09-25 PROCEDURE — 83036 HEMOGLOBIN GLYCOSYLATED A1C: CPT

## 2024-09-25 PROCEDURE — 80053 COMPREHEN METABOLIC PANEL: CPT

## 2024-09-25 PROCEDURE — 36415 COLL VENOUS BLD VENIPUNCTURE: CPT

## 2024-09-25 RX ORDER — ATORVASTATIN CALCIUM 10 MG/1
10 TABLET, FILM COATED ORAL DAILY
Qty: 100 TABLET | Refills: 1 | Status: SHIPPED | OUTPATIENT
Start: 2024-09-25 | End: 2025-10-30

## 2024-09-25 NOTE — PROGRESS NOTES
"PCP: Neema Antony MD   I have reviewed existing histories, notes, past medical history, surgical history, social history, family history, med list, allergy list, and immunization, and updated.    HPI:   3 months Follow up for anxiety, xanax.  Not due for refills yet. He will email me when he needs it.  Doing well on it.  Pt with Dm, ldl 117, htn, ex-smoker. He quit smoking 30 years ago  Discussed CT cardiac calcium score and AAA screening. He will get these done.  Had not wanted statin. This year new recommendation for diabetic is LDL at 75 or below.  Discussed potential Side effects. He would like to start a statin      Lab Results   Component Value Date    WBC CANCELED 05/25/2023    HGB CANCELED 05/25/2023    HCT CANCELED 05/25/2023    PLT CANCELED 05/25/2023    CHOL 192 08/09/2023    TRIG 152 (H) 08/09/2023    HDL 45.0 08/09/2023    LDLDIRECT 107 12/22/2021    ALT 38 08/09/2023    AST 35 08/09/2023     03/05/2024    K 4.6 03/05/2024     03/05/2024    CREATININE 1.09 03/05/2024    BUN 23 03/05/2024    CO2 25 03/05/2024    PSA 0.43 01/23/2021    HGBA1C 7.6 (H) 03/05/2024     Physical exam:  /61   Pulse 68   Temp 36.7 °C (98.1 °F)   Ht 1.727 m (5' 8\")   Wt 76.7 kg (169 lb)   SpO2 95%   BMI 25.70 kg/m²         Assessments/orders:   Assessment & Plan  Anxiety         Hyperlipidemia, unspecified hyperlipidemia type    Orders:    atorvastatin (Lipitor) 10 mg tablet; Take 1 tablet (10 mg) by mouth once daily.    CT cardiac scoring wo IV contrast; Future    Type 2 diabetes mellitus with other specified complication, without long-term current use of insulin    Orders:    atorvastatin (Lipitor) 10 mg tablet; Take 1 tablet (10 mg) by mouth once daily.    CT cardiac scoring wo IV contrast; Future    Lipid Panel; Future    Hemoglobin A1C; Future    Comprehensive Metabolic Panel; Future    Screening for AAA (abdominal aortic aneurysm)    Orders:    Vascular US Abdominal Aorta Aneurysm AAA Screening; " Future       OARRS:  No data recorded  I have personally reviewed the OARRS report for Rodrigo Dill. I have considered the risks of abuse, dependence, addiction and diversion    Is the patient prescribed a combination of a benzodiazepine and opioid?  No    Last Urine Drug Screen / ordered today: Yes  Recent Results (from the past 8760 hour(s))   Confirmation Opiate/Opioid/Benzo Prescription Compliance    Collection Time: 03/05/24 12:22 PM   Result Value Ref Range    Clonazepam <25 <25 ng/mL    7-Aminoclonazepam <25 <25 ng/mL    Alprazolam 49 (H) <25 ng/mL    Alpha-Hydroxyalprazolam 53 (H) <25 ng/mL    Midazolam <25 <25 ng/mL    Alpha-Hydroxymidazolam <25 <25 ng/mL    Chlordiazepoxide <25 <25 ng/mL    Diazepam <25 <25 ng/mL    Nordiazepam <25 <25 ng/mL    Temazepam <25 <25 ng/mL    Oxazepam <25 <25 ng/mL    Lorazepam <25 <25 ng/mL    Methadone <25 <25 ng/mL    EDDP <25 <25 ng/mL    6-Acetylmorphine <25 <25 ng/mL    Codeine <50 <50 ng/mL    Hydrocodone <25 <25 ng/mL    Hydromorphone <25 <25 ng/mL    Morphine  <50 <50 ng/mL    Norhydrocodone <25 <25 ng/mL    Noroxycodone <25 <25 ng/mL    Oxycodone <25 <25 ng/mL    Oxymorphone <25 <25 ng/mL    Fentanyl <2.5 <2.5 ng/mL    Norfentanyl <2.5 <2.5 ng/mL    Tramadol <50 <50 ng/mL    O-Desmethyltramadol <50 <50 ng/mL    Zolpidem <25 <25 ng/mL    Zolpidem Metabolite (ZCA) <25 <25 ng/mL   Screen Opiate/Opioid/Benzo Prescription Compliance    Collection Time: 03/05/24 12:22 PM   Result Value Ref Range    Creatinine, Urine Random 109.0 20.0 - 370.0 mg/dL    Amphetamine Screen, Urine Presumptive Negative Presumptive Negative    Barbiturate Screen, Urine Presumptive Negative Presumptive Negative    Cannabinoid Screen, Urine Presumptive Negative Presumptive Negative    Cocaine Metabolite Screen, Urine Presumptive Negative Presumptive Negative    PCP Screen, Urine Presumptive Negative Presumptive Negative     Results are as expected.         Controlled Substance Agreement:  Date of  the Last Agreement: in may 2024  Reviewed Controlled Substance Agreement including but not limited to the benefits, risks, and alternatives to treatment with a Controlled Substance medication(s).    Benzodiazepines:  What is the patient's goal of therapy? anxiety  Is this being achieved with current treatment?  yes      Activities of Daily Living:   Is your overall impression that this patient is benefiting (symptom reduction outweighs side effects) from benzodiazepine therapy? Yes     1. Physical Functioning: Better  2. Family Relationship: Better  3. Social Relationship: Better  4. Mood: Better  5. Sleep Patterns: Better  6. Overall Function: Better

## 2024-09-25 NOTE — ASSESSMENT & PLAN NOTE
Orders:    atorvastatin (Lipitor) 10 mg tablet; Take 1 tablet (10 mg) by mouth once daily.    CT cardiac scoring wo IV contrast; Future

## 2024-09-25 NOTE — ASSESSMENT & PLAN NOTE
Orders:    atorvastatin (Lipitor) 10 mg tablet; Take 1 tablet (10 mg) by mouth once daily.    CT cardiac scoring wo IV contrast; Future    Lipid Panel; Future    Hemoglobin A1C; Future    Comprehensive Metabolic Panel; Future

## 2024-10-14 ENCOUNTER — PATIENT MESSAGE (OUTPATIENT)
Dept: PRIMARY CARE | Facility: CLINIC | Age: 69
End: 2024-10-14
Payer: MEDICARE

## 2024-10-14 DIAGNOSIS — F41.9 ANXIETY: ICD-10-CM

## 2024-10-14 RX ORDER — ALPRAZOLAM 0.5 MG/1
0.5 TABLET ORAL NIGHTLY PRN
Qty: 30 TABLET | Refills: 0 | Status: SHIPPED | OUTPATIENT
Start: 2024-10-14 | End: 2025-01-12

## 2024-10-21 PROBLEM — F19.21 HISTORY OF SUBSTANCE DEPENDENCE (MULTI): Status: RESOLVED | Noted: 2024-06-20 | Resolved: 2024-10-21

## 2024-11-11 ENCOUNTER — APPOINTMENT (OUTPATIENT)
Dept: PRIMARY CARE | Facility: CLINIC | Age: 69
End: 2024-11-11
Payer: MEDICARE

## 2024-11-17 ENCOUNTER — PATIENT MESSAGE (OUTPATIENT)
Dept: PRIMARY CARE | Facility: CLINIC | Age: 69
End: 2024-11-17
Payer: MEDICARE

## 2024-11-17 DIAGNOSIS — F41.9 ANXIETY: ICD-10-CM

## 2024-11-18 RX ORDER — ALPRAZOLAM 0.5 MG/1
0.5 TABLET ORAL NIGHTLY PRN
Qty: 30 TABLET | Refills: 0 | Status: SHIPPED | OUTPATIENT
Start: 2024-11-18 | End: 2025-02-16

## 2024-12-02 ENCOUNTER — APPOINTMENT (OUTPATIENT)
Dept: PRIMARY CARE | Facility: CLINIC | Age: 69
End: 2024-12-02
Payer: MEDICARE

## 2025-01-06 DIAGNOSIS — F41.9 ANXIETY: ICD-10-CM

## 2025-01-07 RX ORDER — ALPRAZOLAM 0.5 MG/1
TABLET ORAL
Qty: 30 TABLET | Refills: 0 | Status: SHIPPED | OUTPATIENT
Start: 2025-01-07

## 2025-01-15 ENCOUNTER — PATIENT MESSAGE (OUTPATIENT)
Dept: PRIMARY CARE | Facility: CLINIC | Age: 70
End: 2025-01-15
Payer: MEDICARE

## 2025-01-15 DIAGNOSIS — F41.9 ANXIETY: ICD-10-CM

## 2025-01-16 DIAGNOSIS — F41.9 ANXIETY: ICD-10-CM

## 2025-01-16 RX ORDER — ALPRAZOLAM 0.5 MG/1
TABLET ORAL
Qty: 30 TABLET | Refills: 0 | Status: CANCELLED | OUTPATIENT
Start: 2025-01-16

## 2025-01-16 RX ORDER — ALPRAZOLAM 0.5 MG/1
0.5 TABLET ORAL NIGHTLY PRN
Qty: 30 TABLET | Refills: 0 | Status: CANCELLED | OUTPATIENT
Start: 2025-01-16

## 2025-01-20 DIAGNOSIS — F41.9 ANXIETY: ICD-10-CM

## 2025-01-20 RX ORDER — ALPRAZOLAM 0.5 MG/1
0.5 TABLET ORAL NIGHTLY PRN
Qty: 30 TABLET | Refills: 0 | Status: SHIPPED | OUTPATIENT
Start: 2025-01-20

## 2025-01-20 RX ORDER — ALPRAZOLAM 0.5 MG/1
TABLET ORAL
Qty: 30 TABLET | Refills: 0 | Status: CANCELLED | OUTPATIENT
Start: 2025-01-20

## 2025-02-20 ENCOUNTER — PATIENT MESSAGE (OUTPATIENT)
Dept: PRIMARY CARE | Facility: CLINIC | Age: 70
End: 2025-02-20
Payer: COMMERCIAL

## 2025-02-20 DIAGNOSIS — F41.9 ANXIETY: ICD-10-CM

## 2025-02-20 RX ORDER — ALPRAZOLAM 0.5 MG/1
0.5 TABLET ORAL NIGHTLY PRN
Qty: 30 TABLET | Refills: 0 | Status: SHIPPED | OUTPATIENT
Start: 2025-02-20

## 2025-03-04 ENCOUNTER — PATIENT MESSAGE (OUTPATIENT)
Dept: PRIMARY CARE | Facility: CLINIC | Age: 70
End: 2025-03-04
Payer: COMMERCIAL

## 2025-03-04 DIAGNOSIS — I10 BENIGN ESSENTIAL HTN: ICD-10-CM

## 2025-03-04 RX ORDER — LISINOPRIL 5 MG/1
5 TABLET ORAL DAILY
Qty: 30 TABLET | Refills: 0 | Status: SHIPPED | OUTPATIENT
Start: 2025-03-04

## 2025-03-17 ENCOUNTER — APPOINTMENT (OUTPATIENT)
Dept: PRIMARY CARE | Facility: CLINIC | Age: 70
End: 2025-03-17
Payer: MEDICARE

## 2025-03-17 VITALS
BODY MASS INDEX: 25.91 KG/M2 | TEMPERATURE: 98 F | DIASTOLIC BLOOD PRESSURE: 62 MMHG | WEIGHT: 171 LBS | SYSTOLIC BLOOD PRESSURE: 113 MMHG | HEIGHT: 68 IN | HEART RATE: 74 BPM | OXYGEN SATURATION: 94 %

## 2025-03-17 DIAGNOSIS — E78.5 HYPERLIPIDEMIA, UNSPECIFIED HYPERLIPIDEMIA TYPE: ICD-10-CM

## 2025-03-17 DIAGNOSIS — Z12.5 SCREENING FOR PROSTATE CANCER: ICD-10-CM

## 2025-03-17 DIAGNOSIS — Z79.899 HIGH RISK MEDICATION USE: ICD-10-CM

## 2025-03-17 DIAGNOSIS — I10 BENIGN ESSENTIAL HTN: ICD-10-CM

## 2025-03-17 DIAGNOSIS — Z12.11 COLON CANCER SCREENING: ICD-10-CM

## 2025-03-17 DIAGNOSIS — F41.9 ANXIETY: ICD-10-CM

## 2025-03-17 DIAGNOSIS — Z00.00 MEDICARE ANNUAL WELLNESS VISIT, SUBSEQUENT: Primary | ICD-10-CM

## 2025-03-17 DIAGNOSIS — Z00.00 PHYSICAL EXAM: ICD-10-CM

## 2025-03-17 DIAGNOSIS — E11.69 TYPE 2 DIABETES MELLITUS WITH OTHER SPECIFIED COMPLICATION, WITHOUT LONG-TERM CURRENT USE OF INSULIN: ICD-10-CM

## 2025-03-17 DIAGNOSIS — J45.909 ASTHMA, UNSPECIFIED ASTHMA SEVERITY, UNSPECIFIED WHETHER COMPLICATED, UNSPECIFIED WHETHER PERSISTENT (HHS-HCC): ICD-10-CM

## 2025-03-17 DIAGNOSIS — N52.9 ERECTILE DYSFUNCTION, UNSPECIFIED ERECTILE DYSFUNCTION TYPE: ICD-10-CM

## 2025-03-17 RX ORDER — TADALAFIL 10 MG/1
10 TABLET ORAL DAILY PRN
Qty: 10 TABLET | Refills: 1 | Status: SHIPPED | OUTPATIENT
Start: 2025-03-17 | End: 2026-03-17

## 2025-03-17 RX ORDER — BUSPIRONE HYDROCHLORIDE 5 MG/1
5 TABLET ORAL 2 TIMES DAILY
Qty: 180 TABLET | Refills: 3 | Status: SHIPPED | OUTPATIENT
Start: 2025-03-17 | End: 2026-03-17

## 2025-03-17 RX ORDER — MOMETASONE FUROATE AND FORMOTEROL FUMARATE DIHYDRATE 200; 5 UG/1; UG/1
2 AEROSOL RESPIRATORY (INHALATION)
Qty: 13 G | Refills: 1 | Status: SHIPPED | OUTPATIENT
Start: 2025-03-17

## 2025-03-17 RX ORDER — ALPRAZOLAM 0.5 MG/1
0.5 TABLET ORAL NIGHTLY PRN
Qty: 30 TABLET | Refills: 0 | Status: SHIPPED | OUTPATIENT
Start: 2025-03-17

## 2025-03-17 RX ORDER — ATORVASTATIN CALCIUM 10 MG/1
10 TABLET, FILM COATED ORAL DAILY
Qty: 100 TABLET | Refills: 3 | Status: SHIPPED | OUTPATIENT
Start: 2025-03-17 | End: 2026-04-21

## 2025-03-17 RX ORDER — ALBUTEROL SULFATE 90 UG/1
2 INHALANT RESPIRATORY (INHALATION)
Qty: 18 G | Refills: 2 | Status: SHIPPED | OUTPATIENT
Start: 2025-03-17

## 2025-03-17 RX ORDER — LISINOPRIL 5 MG/1
5 TABLET ORAL DAILY
Qty: 90 TABLET | Refills: 3 | Status: SHIPPED | OUTPATIENT
Start: 2025-03-17

## 2025-03-17 ASSESSMENT — ANXIETY QUESTIONNAIRES
2. NOT BEING ABLE TO STOP OR CONTROL WORRYING: SEVERAL DAYS
3. WORRYING TOO MUCH ABOUT DIFFERENT THINGS: MORE THAN HALF THE DAYS
6. BECOMING EASILY ANNOYED OR IRRITABLE: MORE THAN HALF THE DAYS
GAD7 TOTAL SCORE: 6
IF YOU CHECKED OFF ANY PROBLEMS ON THIS QUESTIONNAIRE, HOW DIFFICULT HAVE THESE PROBLEMS MADE IT FOR YOU TO DO YOUR WORK, TAKE CARE OF THINGS AT HOME, OR GET ALONG WITH OTHER PEOPLE: NOT DIFFICULT AT ALL
4. TROUBLE RELAXING: NOT AT ALL
7. FEELING AFRAID AS IF SOMETHING AWFUL MIGHT HAPPEN: NOT AT ALL
1. FEELING NERVOUS, ANXIOUS, OR ON EDGE: SEVERAL DAYS
5. BEING SO RESTLESS THAT IT IS HARD TO SIT STILL: NOT AT ALL

## 2025-03-17 ASSESSMENT — ACTIVITIES OF DAILY LIVING (ADL)
TAKING_MEDICATION: INDEPENDENT
MANAGING_FINANCES: INDEPENDENT
BATHING: INDEPENDENT
GROCERY_SHOPPING: INDEPENDENT
DRESSING: INDEPENDENT
DOING_HOUSEWORK: INDEPENDENT

## 2025-03-17 ASSESSMENT — PATIENT HEALTH QUESTIONNAIRE - PHQ9
SUM OF ALL RESPONSES TO PHQ9 QUESTIONS 1 AND 2: 0
4. FEELING TIRED OR HAVING LITTLE ENERGY: NOT AT ALL
2. FEELING DOWN, DEPRESSED OR HOPELESS: NOT AT ALL
3. TROUBLE FALLING OR STAYING ASLEEP OR SLEEPING TOO MUCH: NOT AT ALL
2. FEELING DOWN, DEPRESSED OR HOPELESS: NOT AT ALL
5. POOR APPETITE OR OVEREATING: NOT AT ALL
1. LITTLE INTEREST OR PLEASURE IN DOING THINGS: NOT AT ALL
SUM OF ALL RESPONSES TO PHQ QUESTIONS 1-9: 0
1. LITTLE INTEREST OR PLEASURE IN DOING THINGS: NOT AT ALL
9. THOUGHTS THAT YOU WOULD BE BETTER OFF DEAD, OR OF HURTING YOURSELF: NOT AT ALL
6. FEELING BAD ABOUT YOURSELF - OR THAT YOU ARE A FAILURE OR HAVE LET YOURSELF OR YOUR FAMILY DOWN: NOT AT ALL
SUM OF ALL RESPONSES TO PHQ9 QUESTIONS 1 AND 2: 0
8. MOVING OR SPEAKING SO SLOWLY THAT OTHER PEOPLE COULD HAVE NOTICED. OR THE OPPOSITE, BEING SO FIGETY OR RESTLESS THAT YOU HAVE BEEN MOVING AROUND A LOT MORE THAN USUAL: NOT AT ALL
7. TROUBLE CONCENTRATING ON THINGS, SUCH AS READING THE NEWSPAPER OR WATCHING TELEVISION: NOT AT ALL

## 2025-03-17 NOTE — ASSESSMENT & PLAN NOTE
Orders:    albuterol 90 mcg/actuation inhaler; Inhale 2 puffs 3 times a day.    mometasone-formoterol (Dulera) 200-5 mcg/actuation inhaler; Inhale 2 puffs 2 times a day. INHALE 2 PUFFS TWICE DAILY.

## 2025-03-17 NOTE — PROGRESS NOTES
"PCP: Neema Antony MD    SUBJECTIVE:   Rodrigo Dill is a 69 y.o. male presenting for his annual physical/wellness.    Also Follow up.  Taking benzo regularly.  Has DM, diet controlled. He is due to see eye doctor, and will be seeing them soon. He monitors bs regularly, usually in good range, low 100's    No cp sob leg edema.  No Blurry vision.  No Numbness, tingling in feet    Colon screening: due. No fhx colon ca  Prostate screening:   Lab Results   Component Value Date    PSA 0.43 01/23/2021     Vaccines:  Up to date except for covid booster, which he will get today  Diet:   healthy.  Exercises:   regularly.  Lab Results   Component Value Date    HGBA1C 7.5 (H) 09/25/2024     Lab Results   Component Value Date    CREATININE 1.09 09/25/2024     Lab Results   Component Value Date    CHOL 172 09/25/2024    CHOL 192 08/09/2023    CHOL 171 12/22/2021     Lab Results   Component Value Date    HDL 42.2 09/25/2024    HDL 45.0 08/09/2023    HDL 38.4 (A) 12/22/2021     Lab Results   Component Value Date    LDLCALC 79 09/25/2024     Lab Results   Component Value Date    TRIG 254 (H) 09/25/2024    TRIG 152 (H) 08/09/2023    TRIG 89 01/23/2021       ROS:  Feeling well. No dyspnea or chest pain on exertion. No abdominal pain, change in bowel habits, black or bloody stools. No urinary tract or prostatic symptoms. No neurological complaints.       OBJECTIVE:   The patient appears well, alert, oriented x 3, in no distress.   /62   Pulse 74   Temp 36.7 °C (98 °F)   Ht 1.727 m (5' 8\")   Wt 77.6 kg (171 lb)   SpO2 94%   BMI 26.00 kg/m²   ENT normal.  Neck supple. No adenopathy or thyromegaly. DORIS. Lungs are clear, good air entry, no wheezes, rhonchi or rales. S1 and S2 normal, no murmurs, regular rate and rhythm. Abdomen is soft without tenderness, guarding, mass or organomegaly. Extremities show no edema, normal peripheral pulses. Neurological is normal without focal findings.      Assessment & Plan  Medicare annual " wellness visit, subsequent         Physical exam         High risk medication use    Orders:    Opiate/Opioid/Benzo Prescription Compliance    Anxiety    Orders:    ALPRAZolam (Xanax) 0.5 mg tablet; Take 1 tablet (0.5 mg) by mouth as needed at bedtime for anxiety.    busPIRone (Buspar) 5 mg tablet; Take 1 tablet (5 mg) by mouth 2 times a day.    Hyperlipidemia, unspecified hyperlipidemia type    Orders:    atorvastatin (Lipitor) 10 mg tablet; Take 1 tablet (10 mg) by mouth once daily.    Lipid Panel; Future    Type 2 diabetes mellitus with other specified complication, without long-term current use of insulin    Orders:    atorvastatin (Lipitor) 10 mg tablet; Take 1 tablet (10 mg) by mouth once daily.    Hemoglobin A1C; Future    Lipid Panel; Future    Basic Metabolic Panel; Future    Albumin-Creatinine Ratio, Urine Random; Future    Benign essential HTN    Orders:    lisinopril 5 mg tablet; Take 1 tablet (5 mg) by mouth once daily.    Erectile dysfunction, unspecified erectile dysfunction type    Orders:    tadalafil (Cialis) 10 mg tablet; Take 1 tablet (10 mg) by mouth once daily as needed for erectile dysfunction.    Asthma, unspecified asthma severity, unspecified whether complicated, unspecified whether persistent ([facility])    Orders:    albuterol 90 mcg/actuation inhaler; Inhale 2 puffs 3 times a day.    mometasone-formoterol (Dulera) 200-5 mcg/actuation inhaler; Inhale 2 puffs 2 times a day. INHALE 2 PUFFS TWICE DAILY.    Screening for prostate cancer    Orders:    Prostate Specific Antigen, Screen; Future    Colon cancer screening    Orders:    Cologuard® colon cancer screening; Future          OARRS:  today  I have personally reviewed the OARRS report for Rodrigo Dill. I have considered the risks of abuse, dependence, addiction and diversion    Is the patient prescribed a combination of a benzodiazepine and opioid?  No    Last Urine Drug Screen / ordered today: Yes  No results found for this or any previous  visit (from the past 8760 hours).  N/A        Controlled Substance Agreement:  Date of the Last Agreement: today  Reviewed Controlled Substance Agreement including but not limited to the benefits, risks, and alternatives to treatment with a Controlled Substance medication(s).    Benzodiazepines:  What is the patient's goal of therapy? anxiety  Is this being achieved with current treatment? yes    VIVI-7:  Over the last 2 weeks, how often have you been bothered by any of the following problems?  Feeling nervous, anxious, or on edge: 1  Not being able to stop or control worryin  Worrying too much about different things: 2  Trouble relaxin  Being so restless that it is hard to sit still: 0  Becoming easily annoyed or irritable: 2  Feeling afraid as if something awful might happen: 0  VIVI-7 Total Score: 6        Activities of Daily Living:   Is your overall impression that this patient is benefiting (symptom reduction outweighs side effects) from benzodiazepine therapy? Yes     1. Physical Functioning: Better  2. Family Relationship: Better  3. Social Relationship: Better  4. Mood: Better  5. Sleep Patterns: Better  6. Overall Function: Better

## 2025-03-17 NOTE — ASSESSMENT & PLAN NOTE
Orders:    atorvastatin (Lipitor) 10 mg tablet; Take 1 tablet (10 mg) by mouth once daily.    Hemoglobin A1C; Future    Lipid Panel; Future    Basic Metabolic Panel; Future    Albumin-Creatinine Ratio, Urine Random; Future

## 2025-03-17 NOTE — ASSESSMENT & PLAN NOTE
Orders:    ALPRAZolam (Xanax) 0.5 mg tablet; Take 1 tablet (0.5 mg) by mouth as needed at bedtime for anxiety.    busPIRone (Buspar) 5 mg tablet; Take 1 tablet (5 mg) by mouth 2 times a day.

## 2025-03-19 LAB
1OH-MIDAZOLAM UR-MCNC: NEGATIVE NG/ML
7AMINOCLONAZEPAM UR-MCNC: NEGATIVE NG/ML
A-OH ALPRAZ UR-MCNC: 42 NG/ML
A-OH-TRIAZOLAM UR-MCNC: NEGATIVE NG/ML
AMPHETAMINES UR QL: NEGATIVE NG/ML
BARBITURATES UR QL: NEGATIVE NG/ML
BZE UR QL: NEGATIVE NG/ML
CODEINE UR-MCNC: NEGATIVE NG/ML
CREAT UR-MCNC: 80.9 MG/DL
DRUG SCREEN COMMENT UR-IMP: ABNORMAL
EDDP UR-MCNC: NEGATIVE NG/ML
FENTANYL UR-MCNC: NEGATIVE NG/ML
HYDROCODONE UR-MCNC: NEGATIVE NG/ML
HYDROMORPHONE UR-MCNC: NEGATIVE NG/ML
LORAZEPAM UR-MCNC: NEGATIVE NG/ML
METHADONE UR-MCNC: NEGATIVE NG/ML
MORPHINE UR-MCNC: NEGATIVE NG/ML
NORDIAZEPAM UR-MCNC: NEGATIVE NG/ML
NORFENTANYL UR-MCNC: NEGATIVE NG/ML
NORHYDROCODONE UR CFM-MCNC: NEGATIVE NG/ML
NOROXYCODONE UR CFM-MCNC: NEGATIVE NG/ML
NORTRAMADOL UR-MCNC: NEGATIVE NG/ML
OH-ETHYLFLURAZ UR-MCNC: NEGATIVE NG/ML
OXAZEPAM UR-MCNC: NEGATIVE NG/ML
OXIDANTS UR QL: NEGATIVE MCG/ML
OXYCODONE UR CFM-MCNC: NEGATIVE NG/ML
OXYMORPHONE UR CFM-MCNC: NEGATIVE NG/ML
PCP UR QL: NEGATIVE NG/ML
PH UR: 5.7 [PH] (ref 4.5–9)
QUEST 6 ACETYLMORPHINE: NEGATIVE NG/ML
QUEST NOTES AND COMMENTS: ABNORMAL
QUEST ZOLPIDEM: NEGATIVE NG/ML
TEMAZEPAM UR-MCNC: NEGATIVE NG/ML
THC UR QL: NEGATIVE NG/ML
TRAMADOL UR-MCNC: NEGATIVE NG/ML
ZOLPIDEM PHENYL-4-CARB UR CFM-MCNC: NEGATIVE NG/ML

## 2025-04-16 ENCOUNTER — PATIENT MESSAGE (OUTPATIENT)
Dept: PRIMARY CARE | Facility: CLINIC | Age: 70
End: 2025-04-16
Payer: COMMERCIAL

## 2025-04-16 DIAGNOSIS — F41.9 ANXIETY: ICD-10-CM

## 2025-04-17 RX ORDER — ALPRAZOLAM 0.5 MG/1
0.5 TABLET ORAL NIGHTLY PRN
Qty: 30 TABLET | Refills: 0 | Status: SHIPPED | OUTPATIENT
Start: 2025-04-17

## 2025-05-07 ENCOUNTER — PATIENT MESSAGE (OUTPATIENT)
Dept: PRIMARY CARE | Facility: CLINIC | Age: 70
End: 2025-05-07
Payer: COMMERCIAL

## 2025-05-07 DIAGNOSIS — K21.9 GASTROESOPHAGEAL REFLUX DISEASE WITHOUT ESOPHAGITIS: ICD-10-CM

## 2025-05-07 RX ORDER — LANSOPRAZOLE 30 MG/1
30 CAPSULE, DELAYED RELEASE ORAL
Qty: 30 CAPSULE | Refills: 11 | Status: SHIPPED | OUTPATIENT
Start: 2025-05-07 | End: 2026-05-07

## 2025-05-13 ENCOUNTER — APPOINTMENT (OUTPATIENT)
Dept: ORTHOPEDIC SURGERY | Facility: CLINIC | Age: 70
End: 2025-05-13
Payer: COMMERCIAL

## 2025-05-13 ENCOUNTER — HOSPITAL ENCOUNTER (OUTPATIENT)
Dept: RADIOLOGY | Facility: CLINIC | Age: 70
Discharge: HOME | End: 2025-05-13
Payer: COMMERCIAL

## 2025-05-13 DIAGNOSIS — M25.511 CHRONIC RIGHT SHOULDER PAIN: ICD-10-CM

## 2025-05-13 DIAGNOSIS — G89.29 CHRONIC RIGHT SHOULDER PAIN: ICD-10-CM

## 2025-05-13 PROCEDURE — 73030 X-RAY EXAM OF SHOULDER: CPT | Mod: RIGHT SIDE | Performed by: RADIOLOGY

## 2025-05-13 PROCEDURE — 73030 X-RAY EXAM OF SHOULDER: CPT | Mod: RT

## 2025-05-13 PROCEDURE — 99213 OFFICE O/P EST LOW 20 MIN: CPT | Performed by: ORTHOPAEDIC SURGERY

## 2025-05-13 NOTE — PROGRESS NOTES
Patient is 2 years status post right reverse shoulder arthroplasty right shoulder joint doing very well with some minor discomfort anterior aspect of the shoulder with activities  No change in interval medical history  Examination: Awake alert oriented appropriate  Right shoulder healed surgical scar elevates 130 degrees has fairly good strength  X-rays show no evidence of loosening of the prosthetic  Assessment status post reverse shoulder arthroplasty doing well reviewed precautions expectations follow-up annually or as needed

## 2025-05-18 ENCOUNTER — PATIENT MESSAGE (OUTPATIENT)
Dept: PRIMARY CARE | Facility: CLINIC | Age: 70
End: 2025-05-18
Payer: COMMERCIAL

## 2025-05-18 DIAGNOSIS — F41.9 ANXIETY: ICD-10-CM

## 2025-05-18 RX ORDER — ALPRAZOLAM 0.5 MG/1
0.5 TABLET ORAL NIGHTLY PRN
Qty: 30 TABLET | Refills: 0 | Status: SHIPPED | OUTPATIENT
Start: 2025-05-18

## 2025-05-20 ENCOUNTER — PATIENT MESSAGE (OUTPATIENT)
Dept: PRIMARY CARE | Facility: CLINIC | Age: 70
End: 2025-05-20
Payer: COMMERCIAL

## 2025-05-20 DIAGNOSIS — E11.69 TYPE 2 DIABETES MELLITUS WITH OTHER SPECIFIED COMPLICATION, WITHOUT LONG-TERM CURRENT USE OF INSULIN: ICD-10-CM

## 2025-05-20 RX ORDER — LANCETS 28 GAUGE
100 EACH MISCELLANEOUS EVERY OTHER DAY
COMMUNITY
End: 2025-05-20 | Stop reason: SDUPTHER

## 2025-05-20 RX ORDER — BLOOD-GLUCOSE METER
KIT MISCELLANEOUS
Qty: 100 EACH | Refills: 3 | Status: SHIPPED | OUTPATIENT
Start: 2025-05-20

## 2025-05-20 RX ORDER — LANCETS 28 GAUGE
100 EACH MISCELLANEOUS EVERY OTHER DAY
Qty: 100 EACH | Refills: 3 | Status: SHIPPED | OUTPATIENT
Start: 2025-05-20

## 2025-06-02 ENCOUNTER — APPOINTMENT (OUTPATIENT)
Dept: PRIMARY CARE | Facility: CLINIC | Age: 70
End: 2025-06-02
Payer: COMMERCIAL

## 2025-06-02 VITALS
WEIGHT: 164 LBS | HEIGHT: 67 IN | TEMPERATURE: 98 F | DIASTOLIC BLOOD PRESSURE: 65 MMHG | OXYGEN SATURATION: 95 % | SYSTOLIC BLOOD PRESSURE: 102 MMHG | HEART RATE: 75 BPM | BODY MASS INDEX: 25.74 KG/M2

## 2025-06-02 DIAGNOSIS — F41.9 ANXIETY: Primary | ICD-10-CM

## 2025-06-02 DIAGNOSIS — I10 BENIGN ESSENTIAL HTN: ICD-10-CM

## 2025-06-02 DIAGNOSIS — E11.69 TYPE 2 DIABETES MELLITUS WITH OTHER SPECIFIED COMPLICATION, WITHOUT LONG-TERM CURRENT USE OF INSULIN: ICD-10-CM

## 2025-06-02 PROCEDURE — 3078F DIAST BP <80 MM HG: CPT | Performed by: INTERNAL MEDICINE

## 2025-06-02 PROCEDURE — 1158F ADVNC CARE PLAN TLK DOCD: CPT | Performed by: INTERNAL MEDICINE

## 2025-06-02 PROCEDURE — 1036F TOBACCO NON-USER: CPT | Performed by: INTERNAL MEDICINE

## 2025-06-02 PROCEDURE — 1159F MED LIST DOCD IN RCRD: CPT | Performed by: INTERNAL MEDICINE

## 2025-06-02 PROCEDURE — 3008F BODY MASS INDEX DOCD: CPT | Performed by: INTERNAL MEDICINE

## 2025-06-02 PROCEDURE — 3074F SYST BP LT 130 MM HG: CPT | Performed by: INTERNAL MEDICINE

## 2025-06-02 PROCEDURE — 1123F ACP DISCUSS/DSCN MKR DOCD: CPT | Performed by: INTERNAL MEDICINE

## 2025-06-02 PROCEDURE — 1157F ADVNC CARE PLAN IN RCRD: CPT | Performed by: INTERNAL MEDICINE

## 2025-06-02 PROCEDURE — 4010F ACE/ARB THERAPY RXD/TAKEN: CPT | Performed by: INTERNAL MEDICINE

## 2025-06-02 PROCEDURE — 99213 OFFICE O/P EST LOW 20 MIN: CPT | Performed by: INTERNAL MEDICINE

## 2025-06-02 NOTE — PROGRESS NOTES
OARRS:  Neema Antony MD on 6/2/2025 10:37 AM  I have personally reviewed the OARRS report for Rodrigo Dill Sr.. I have considered the risks of abuse, dependence, addiction and diversion and I believe that it is clinically appropriate for Rodrigo Dill Sr. to be prescribed this medication    Is the patient prescribed a combination of a benzodiazepine and opioid? no    Last Urine Drug Screen / ordered today: no, but Up to date   Recent Results (from the past 8760 hours)   Opiate/Opioid/Benzo Prescription Compliance    Collection Time: 03/17/25 11:02 AM   Result Value Ref Range    Creatinine 80.9 > or = 20.0 mg/dL    pH 5.7 4.5 - 9.0    Oxidant NEGATIVE <200 mcg/mL    Amphetamines NEGATIVE <500 ng/mL    Barbiturates NEGATIVE <300 ng/mL    Cocaine Metabolite NEGATIVE <150 ng/mL    Marijuana Metabolite NEGATIVE <20 ng/mL    Phencyclidine NEGATIVE <25 ng/mL    Alphahydroxyalprazolam 42 (H) <25 ng/mL    Alphahydroxymidazolam NEGATIVE <50 ng/mL    Alphahydroxytriazolam NEGATIVE <50 ng/mL    Aminoclonazepam NEGATIVE <25 ng/mL    Hydroxyethylflurazepam NEGATIVE <50 ng/mL    Lorazepam NEGATIVE <50 ng/mL    Nordiazepam NEGATIVE <50 ng/mL    Oxazepam NEGATIVE <50 ng/mL    Temazepam NEGATIVE <50 ng/mL    Benzodiazepines Comments      Fentanyl NEGATIVE <0.5 ng/mL    Norfentanyl NEGATIVE <0.5 ng/mL    Fentanyl Comments      6 Acetylmorphine NEGATIVE <10 ng/mL    Heroin Metab Comments      EDDP NEGATIVE <100 ng/mL    Methadone NEGATIVE <100 ng/mL    Methadone Comments      Codeine NEGATIVE <50 ng/mL    Hydrocodone NEGATIVE <50 ng/mL    Hydromorphone NEGATIVE <50 ng/mL    Morphine NEGATIVE <50 ng/mL    Norhydrocodone NEGATIVE <50 ng/mL    Opiates Comments      Noroxycodone NEGATIVE <50 ng/mL    Oxycodone NEGATIVE <50 ng/mL    Oxymorphone NEGATIVE <50 ng/mL    Oxycodone Comments      Desmethyltramadol NEGATIVE <100 ng/mL    Tramadol NEGATIVE <100 ng/mL    Tramadol Comments      Zolpidem NEGATIVE <5 ng/mL    Zolpidem  "Metabolite NEGATIVE <5 ng/mL    Zolpidem Comments      Notes and Comments       N/A        Controlled Substance Agreement:  Date of the Last Agreement: March 2025  Reviewed Controlled Substance Agreement including but not limited to the benefits, risks, and alternatives to treatment with a Controlled Substance medication(s).    Benzodiazepines  What is the patient's goal of therapy? anxiety  Is this being achieved with current treatment? yes    Activities of Daily Living:   Is your overall impression that this patient is benefiting (symptom reduction outweighs side effects) from benzodiazepine therapy? Yes     1. Physical Functioning: Better  2. Family Relationship: Better  3. Social Relationship: Better  4. Mood: Better  5. Sleep Patterns: Better  6. Overall Function: Better    PCP: Neema Antony MD   I have reviewed existing histories, notes, past medical history, surgical history, social history, family history, med list, allergy list, and immunization, and updated.    HPI:   Doing well.  Is active physically. And diet is usually healthy  Does not need refills at this time.   Lab Results   Component Value Date    WBC CANCELED 05/25/2023    HGB CANCELED 05/25/2023    HCT CANCELED 05/25/2023    PLT CANCELED 05/25/2023    CHOL 172 09/25/2024    TRIG 254 (H) 09/25/2024    HDL 42.2 09/25/2024    LDLDIRECT 107 12/22/2021    ALT 21 09/25/2024    AST 17 09/25/2024     09/25/2024    K 4.3 09/25/2024     09/25/2024    CREATININE 1.09 09/25/2024    BUN 22 09/25/2024    CO2 27 09/25/2024    PSA 0.43 01/23/2021    HGBA1C 7.5 (H) 09/25/2024     Physical exam:  /65   Pulse 75   Temp 36.7 °C (98 °F)   Ht 1.708 m (5' 7.25\")   Wt 74.4 kg (164 lb)   SpO2 95%   BMI 25.50 kg/m²     Neck exam showed no mass, lymphadenopathy, or thyroid enlargement, and no carotid bruit.  Heart exam showed normal heart sounds, no murmur, clicks, gallops or rubs. Regular rate and rhythm. Chest is clear; no wheezes or rales.   The " abdomen is soft without tenderness, guarding, mass, rebound or organomegaly. Bowel sounds are normal. No CVA tenderness or inguinal adenopathy noted.      Assessments/orders:   Assessment & Plan  Anxiety         Type 2 diabetes mellitus with other specified complication, without long-term current use of insulin         Benign essential HTN         Follow up 3 months

## 2025-06-03 LAB
ALBUMIN/CREAT UR: NORMAL MG/G CREAT
ANION GAP SERPL CALCULATED.4IONS-SCNC: 7 MMOL/L (CALC) (ref 7–17)
BUN SERPL-MCNC: 15 MG/DL (ref 7–25)
BUN/CREAT SERPL: ABNORMAL (CALC) (ref 6–22)
CALCIUM SERPL-MCNC: 9.8 MG/DL (ref 8.6–10.3)
CHLORIDE SERPL-SCNC: 103 MMOL/L (ref 98–110)
CHOLEST SERPL-MCNC: 185 MG/DL
CHOLEST/HDLC SERPL: 3.7 (CALC)
CO2 SERPL-SCNC: 30 MMOL/L (ref 20–32)
CREAT SERPL-MCNC: 1 MG/DL (ref 0.7–1.35)
CREAT UR-MCNC: 57 MG/DL (ref 20–320)
EGFRCR SERPLBLD CKD-EPI 2021: 82 ML/MIN/1.73M2
EST. AVERAGE GLUCOSE BLD GHB EST-MCNC: 180 MG/DL
EST. AVERAGE GLUCOSE BLD GHB EST-SCNC: 10 MMOL/L
GLUCOSE SERPL-MCNC: 162 MG/DL (ref 65–99)
HBA1C MFR BLD: 7.9 %
HDLC SERPL-MCNC: 50 MG/DL
LDLC SERPL CALC-MCNC: 106 MG/DL (CALC)
MICROALBUMIN UR-MCNC: <0.2 MG/DL
NONHDLC SERPL-MCNC: 135 MG/DL (CALC)
POTASSIUM SERPL-SCNC: 4.9 MMOL/L (ref 3.5–5.3)
PSA SERPL-MCNC: 1.23 NG/ML
SODIUM SERPL-SCNC: 140 MMOL/L (ref 135–146)
TRIGL SERPL-MCNC: 173 MG/DL

## 2025-06-18 ENCOUNTER — PATIENT MESSAGE (OUTPATIENT)
Dept: PRIMARY CARE | Facility: CLINIC | Age: 70
End: 2025-06-18
Payer: MEDICARE

## 2025-06-18 DIAGNOSIS — F41.9 ANXIETY: ICD-10-CM

## 2025-06-23 RX ORDER — ALPRAZOLAM 0.5 MG/1
0.5 TABLET ORAL NIGHTLY PRN
Qty: 30 TABLET | Refills: 0 | Status: SHIPPED | OUTPATIENT
Start: 2025-06-23

## 2025-07-16 ENCOUNTER — PATIENT MESSAGE (OUTPATIENT)
Dept: PRIMARY CARE | Facility: CLINIC | Age: 70
End: 2025-07-16
Payer: MEDICARE

## 2025-07-16 DIAGNOSIS — E11.69 TYPE 2 DIABETES MELLITUS WITH OTHER SPECIFIED COMPLICATION, WITHOUT LONG-TERM CURRENT USE OF INSULIN: Primary | ICD-10-CM

## 2025-07-17 RX ORDER — INSULIN PUMP SYRINGE, 3 ML
EACH MISCELLANEOUS
Qty: 1 EACH | Refills: 0 | Status: SHIPPED | OUTPATIENT
Start: 2025-07-17 | End: 2026-07-17

## 2025-07-17 RX ORDER — METFORMIN HYDROCHLORIDE 500 MG/1
500 TABLET, EXTENDED RELEASE ORAL
Qty: 100 TABLET | Refills: 1 | Status: SHIPPED | OUTPATIENT
Start: 2025-07-17 | End: 2026-08-21

## 2025-07-22 ENCOUNTER — PATIENT MESSAGE (OUTPATIENT)
Dept: PRIMARY CARE | Facility: CLINIC | Age: 70
End: 2025-07-22
Payer: MEDICARE

## 2025-07-22 DIAGNOSIS — F41.9 ANXIETY: ICD-10-CM

## 2025-07-23 RX ORDER — ALPRAZOLAM 0.5 MG/1
0.5 TABLET ORAL NIGHTLY PRN
Qty: 30 TABLET | Refills: 0 | Status: SHIPPED | OUTPATIENT
Start: 2025-07-23

## 2025-08-24 ENCOUNTER — PATIENT MESSAGE (OUTPATIENT)
Dept: PRIMARY CARE | Facility: CLINIC | Age: 70
End: 2025-08-24
Payer: MEDICARE

## 2025-08-24 DIAGNOSIS — F41.9 ANXIETY: ICD-10-CM

## 2025-08-25 RX ORDER — ALPRAZOLAM 0.5 MG/1
0.5 TABLET ORAL NIGHTLY PRN
Qty: 30 TABLET | Refills: 0 | Status: SHIPPED | OUTPATIENT
Start: 2025-08-25

## 2025-09-02 ENCOUNTER — APPOINTMENT (OUTPATIENT)
Dept: PRIMARY CARE | Facility: CLINIC | Age: 70
End: 2025-09-02
Payer: COMMERCIAL

## 2025-12-02 ENCOUNTER — APPOINTMENT (OUTPATIENT)
Dept: PRIMARY CARE | Facility: CLINIC | Age: 70
End: 2025-12-02
Payer: COMMERCIAL

## 2026-03-19 ENCOUNTER — APPOINTMENT (OUTPATIENT)
Dept: PRIMARY CARE | Facility: CLINIC | Age: 71
End: 2026-03-19
Payer: COMMERCIAL